# Patient Record
Sex: MALE | Race: OTHER | HISPANIC OR LATINO | URBAN - METROPOLITAN AREA
[De-identification: names, ages, dates, MRNs, and addresses within clinical notes are randomized per-mention and may not be internally consistent; named-entity substitution may affect disease eponyms.]

---

## 2023-05-07 ENCOUNTER — EMERGENCY (EMERGENCY)
Facility: HOSPITAL | Age: 73
LOS: 1 days | Discharge: HOME | End: 2023-05-07
Attending: EMERGENCY MEDICINE | Admitting: EMERGENCY MEDICINE
Payer: MEDICARE

## 2023-05-07 VITALS
SYSTOLIC BLOOD PRESSURE: 121 MMHG | OXYGEN SATURATION: 97 % | RESPIRATION RATE: 18 BRPM | HEART RATE: 77 BPM | DIASTOLIC BLOOD PRESSURE: 76 MMHG | TEMPERATURE: 98 F

## 2023-05-07 VITALS
HEIGHT: 66 IN | HEART RATE: 98 BPM | SYSTOLIC BLOOD PRESSURE: 109 MMHG | TEMPERATURE: 97 F | RESPIRATION RATE: 18 BRPM | OXYGEN SATURATION: 97 % | DIASTOLIC BLOOD PRESSURE: 66 MMHG | WEIGHT: 149.91 LBS

## 2023-05-07 LAB
ANION GAP SERPL CALC-SCNC: 6 MMOL/L — SIGNIFICANT CHANGE UP (ref 5–17)
BUN SERPL-MCNC: 13 MG/DL — SIGNIFICANT CHANGE UP (ref 7–23)
CALCIUM SERPL-MCNC: 9.3 MG/DL — SIGNIFICANT CHANGE UP (ref 8.4–10.5)
CHLORIDE SERPL-SCNC: 100 MMOL/L — SIGNIFICANT CHANGE UP (ref 96–108)
CO2 SERPL-SCNC: 31 MMOL/L — SIGNIFICANT CHANGE UP (ref 22–31)
CREAT SERPL-MCNC: 0.78 MG/DL — SIGNIFICANT CHANGE UP (ref 0.5–1.3)
EGFR: 94 ML/MIN/1.73M2 — SIGNIFICANT CHANGE UP
GLUCOSE SERPL-MCNC: 86 MG/DL — SIGNIFICANT CHANGE UP (ref 70–99)
HCT VFR BLD CALC: 40.8 % — SIGNIFICANT CHANGE UP (ref 39–50)
HGB BLD-MCNC: 13.8 G/DL — SIGNIFICANT CHANGE UP (ref 13–17)
MCHC RBC-ENTMCNC: 32.5 PG — SIGNIFICANT CHANGE UP (ref 27–34)
MCHC RBC-ENTMCNC: 33.8 GM/DL — SIGNIFICANT CHANGE UP (ref 32–36)
MCV RBC AUTO: 96.2 FL — SIGNIFICANT CHANGE UP (ref 80–100)
NRBC # BLD: 0 /100 WBCS — SIGNIFICANT CHANGE UP (ref 0–0)
PLATELET # BLD AUTO: 197 K/UL — SIGNIFICANT CHANGE UP (ref 150–400)
POTASSIUM SERPL-MCNC: 4.7 MMOL/L — SIGNIFICANT CHANGE UP (ref 3.5–5.3)
POTASSIUM SERPL-SCNC: 4.7 MMOL/L — SIGNIFICANT CHANGE UP (ref 3.5–5.3)
RBC # BLD: 4.24 M/UL — SIGNIFICANT CHANGE UP (ref 4.2–5.8)
RBC # FLD: 12.6 % — SIGNIFICANT CHANGE UP (ref 10.3–14.5)
SODIUM SERPL-SCNC: 137 MMOL/L — SIGNIFICANT CHANGE UP (ref 135–145)
WBC # BLD: 4.49 K/UL — SIGNIFICANT CHANGE UP (ref 3.8–10.5)
WBC # FLD AUTO: 4.49 K/UL — SIGNIFICANT CHANGE UP (ref 3.8–10.5)

## 2023-05-07 PROCEDURE — 85027 COMPLETE CBC AUTOMATED: CPT

## 2023-05-07 PROCEDURE — 99283 EMERGENCY DEPT VISIT LOW MDM: CPT

## 2023-05-07 PROCEDURE — 36415 COLL VENOUS BLD VENIPUNCTURE: CPT

## 2023-05-07 PROCEDURE — 99284 EMERGENCY DEPT VISIT MOD MDM: CPT

## 2023-05-07 PROCEDURE — 80048 BASIC METABOLIC PNL TOTAL CA: CPT

## 2023-05-07 RX ORDER — TRAMADOL HYDROCHLORIDE 50 MG/1
1 TABLET ORAL
Qty: 12 | Refills: 0
Start: 2023-05-07

## 2023-05-07 NOTE — ED PROVIDER NOTE - PHYSICAL EXAMINATION
CONSTITUTIONAL: Awake, alert and in no apparent distress.  HEENT: Head is atraumatic. Eyes clear bilaterally, normal EOMI. Airway patent. no facial droop, no subj sensory deficit to either side  CARDIAC: Normal rate, regular rhythm.  Heart sounds S1, S2.   RESPIRATORY: Breath sounds clear and equal bilaterally. no tachypnea, respiratory distress.   GASTROINTESTINAL: Abdomen soft, non-tender, no guarding, distension.  MUSCULOSKELETAL: Spine appears normal, no midline spinal tenderness, range of motion is not limited, no muscle or joint tenderness. no bony tenderness.   NEUROLOGICAL: Alert, no focal deficits, no motor or sensory deficits.  SKIN: Skin normal color for race, warm, dry and intact. No evidence of rash.  PSYCHIATRIC: Normal mood and affect. no apparent risk to self or others.

## 2023-05-07 NOTE — ED ADULT NURSE NOTE - CAS EDN DISCHARGE INTERVENTIONS
n/a Peng Advancement Flap Text: The defect edges were debeveled with a #15 scalpel blade.  Given the location of the defect, shape of the defect and the proximity to free margins a Peng advancement flap was deemed most appropriate.  Using a sterile surgical marker, an appropriate advancement flap was drawn incorporating the defect and placing the expected incisions within the relaxed skin tension lines where possible. The area thus outlined was incised deep to adipose tissue with a #15 scalpel blade.  The skin margins were undermined to an appropriate distance in all directions utilizing iris scissors.

## 2023-05-07 NOTE — ED ADULT NURSE NOTE - CHIEF COMPLAINT QUOTE
Pt to ED c/o worsening facial pain "in waves" over last week. Pt has hx of trigeminal neuralgia. Per pt's daughter "He has been acting more slowly over the last 3 months". Denies dizziness, slurred speech, headache. Ambulates with a steady gait.

## 2023-05-07 NOTE — ED PROVIDER NOTE - OBJECTIVE STATEMENT
73-year-old history of trigeminal neuralgia, hypertension presenting with left facial pain worsening over the last 4 years.  Patient here with daughter, patient states that the left facial pain has inhibited his ability to eat and sleep properly.  Daughter states that as a result has lost a lot of weight greater than 20 pounds because of symptoms.  Currently on gabapentin 800 mg 3 times a day and carbamazepine 200 mg twice a day.  This is in addition to Tylenol and ibuprofen.  Patient denies any facial droop sensory deficits.  Pain fever chills neck pain headache trauma.  No other complaints.  Denies any shortness of breath chest pain leg swelling.  Was diagnosed approximately 4 years ago in [place].

## 2023-05-07 NOTE — ED PROVIDER NOTE - CLINICAL SUMMARY MEDICAL DECISION MAKING FREE TEXT BOX
Ddx: worsening pain from trigmenial neuralgia, no other focal neuro deficits, fever  Will discuss w neurology, reassess.  Discussed w Dr. Jorge, will increase carbamazepine 200mg to 400mg BID and will fu in neuro clinic.

## 2023-05-07 NOTE — ED ADULT NURSE NOTE - OBJECTIVE STATEMENT
Patient presents to the ED complaining of L sided facial pain for months. Patient reports history of trigeminal neuralgia. As per daughter, patient takes medication but it is not helping.

## 2023-05-07 NOTE — ED PROVIDER NOTE - PATIENT PORTAL LINK FT
You can access the FollowMyHealth Patient Portal offered by [facility] by registering at the following website: [email or web address]. By joining [facility]’s FollowMyHealth portal, you will also be able to view your health information using other applications (apps) compatible with our system.

## 2023-05-09 DIAGNOSIS — R51.9 HEADACHE, UNSPECIFIED: ICD-10-CM

## 2023-05-09 DIAGNOSIS — G50.0 TRIGEMINAL NEURALGIA: ICD-10-CM

## 2023-05-09 DIAGNOSIS — I10 ESSENTIAL (PRIMARY) HYPERTENSION: ICD-10-CM

## 2023-05-09 PROBLEM — Z00.00 ENCOUNTER FOR PREVENTIVE HEALTH EXAMINATION: Status: ACTIVE | Noted: 2023-05-09

## 2023-05-10 ENCOUNTER — APPOINTMENT (OUTPATIENT)
Age: 73
End: 2023-05-10
Payer: SELF-PAY

## 2023-05-10 VITALS
BODY MASS INDEX: 22.58 KG/M2 | TEMPERATURE: 98.1 F | SYSTOLIC BLOOD PRESSURE: 109 MMHG | WEIGHT: 149 LBS | HEART RATE: 101 BPM | OXYGEN SATURATION: 95 % | HEIGHT: 68 IN | DIASTOLIC BLOOD PRESSURE: 67 MMHG

## 2023-05-10 PROCEDURE — 99204 OFFICE O/P NEW MOD 45 MIN: CPT

## 2023-05-10 RX ORDER — KRILL/OM-3/DHA/EPA/PHOSPHO/AST 1000-230MG
81 CAPSULE ORAL DAILY
Refills: 0 | Status: ACTIVE | COMMUNITY

## 2023-05-10 NOTE — DATA REVIEWED
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

## 2023-05-10 NOTE — PHYSICAL EXAM
[FreeTextEntry1] : General: this is a pleasant patient in no acute distress\par \par HEENT conjunctiva are normal, no tenderness in head\par \par CV: normal pulses, regular rate and rhythm, no peripheral edema noted\par \par Lungs: breathing is non-labored\par \par abd: soft and non-distended\par \par MSK:\par SLR: \par AP:\par range of motion:\par tinnels: \par spurling:\par Occipital nerve tenderness:\par \par Mental status:\par Alert and oriented to person, place and time, normal speech and comprehension\par \par Cranial Nerves:\par extra-occular movements in tact without nystagmus, normal saccades and smooth pursuit, Face symmetric and facial strength symmetric, facial sensation symmetric, \par \par Motor: normal bulk and tone throughout. no abnormal movements.  Full 5/5 strength uppers and lower extremities proximally and distally\par \par Sensory: in tact and symmetric to vibration, light tough, temperature except stocking and glove decreased temp\par \par Cerebellar: normal finger-nose-finger bilaterally\par \par Reflexes: 2+ in the upper and lower extremities and symmetric.  toes are bilaterally downgoing.\par \par Gait: stable, able to tip toe heel and tandem\par \par Stances:\par Romberg: normal\par

## 2023-05-10 NOTE — HISTORY OF PRESENT ILLNESS
[FreeTextEntry1] : BRENT DIXON is a 73 year who presents with trigeminal neuralgia\par \par 3 years of problems\par \par pain in left side, feels under tongue, bottom and top jaw, stops him from eating. has lost 30lbs due to this. \par \par tried gabapentin 800mg TID, CBZ 400mg BID\par \par tramadol 50mg mildly beneficial\par \par had some face injection that did nothing. never tried OXC.  \par \par [facility] saying that he had 16/30 on an MMSE. he does have more issues recently with increased med doses. tired all the time. sleeps a lot.\par \par he has several other medical comorbidities and recently was hospitalized for abdominal bleeding, also has right heart problems\par \par

## 2023-05-11 LAB — CARBAMAZEPINE SERPL-MCNC: 11.7 UG/ML

## 2023-05-19 ENCOUNTER — APPOINTMENT (OUTPATIENT)
Dept: NEUROSURGERY | Facility: CLINIC | Age: 73
End: 2023-05-19
Payer: SELF-PAY

## 2023-05-19 ENCOUNTER — NON-APPOINTMENT (OUTPATIENT)
Age: 73
End: 2023-05-19

## 2023-05-19 PROCEDURE — 99205 OFFICE O/P NEW HI 60 MIN: CPT

## 2023-05-19 NOTE — PHYSICAL EXAM
[General Appearance - Alert] : alert [General Appearance - In No Acute Distress] : in no acute distress [Oriented To Time, Place, And Person] : oriented to person, place, and time [Person] : oriented to person [Place] : oriented to place [Time] : oriented to time [Motor Tone] : muscle tone was normal in all four extremities [Motor Strength] : muscle strength was normal in all four extremities [Abnormal Walk] : normal gait [Balance] : balance was intact [Hearing Threshold Finger Rub Not Beadle] : hearing was normal [] : no respiratory distress

## 2023-05-21 NOTE — REASON FOR VISIT
[New Patient Visit] : a new patient visit [FreeTextEntry1] : trigeminal neuralgia surgical discussion

## 2023-05-21 NOTE — HISTORY OF PRESENT ILLNESS
[FreeTextEntry1] : BRENT DIXON is a 73 year left handed male with pmhx HTN, COPD, lung nodule, aortic aneurysm, constipation who presents with trigeminal neuralgia referred by Dr. Wiley (neurologist) for surgical consultation. \par Patient has been having left facial pain x 3 years. \par \par Started under tongue, bottom and top jaw, and progressed to the left side of face. Stops him from eating. Has lost 30lbs due to this. Has tried gabapentin 800mg TID, carbamazepine 400mg BID, tramadol 50mg with minimal improvement. Had some face injection that did nothing.\par \par [facility] saying that he had 16/30 on an MMSE. he does have more issues recently with increased med doses. tired all the time. sleeps a lot.\par \par Patient presents today for surgical intervention of trigeminal neuralgia.

## 2023-05-21 NOTE — ASSESSMENT
[FreeTextEntry1] : MRI head w/wo from 1/6/23 reviewed by Dr. 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 Recommend not proceeding with surgical intervention given his medical co-morbidies and age.\par \par Gamma Knife Radiosurgery is reasonable and patient wishes to proceed. Risks, alternatives and benefits to Gamma Knife Radiosurgery discussed. Risks include but are not limited to cerebral edema, radionecrosis, seizures, continued pain. Patient understands and wishes to proceed.\par \par Explained procedure to patient in detail including headframe placement,  new MRI the day of procedure, radiation treatment, and post-procedure care. \par Radiation treatment will take place at [address] [place] [address]. Directions and contact information provided to patient.\par Education packet regarding Gamma Knife Radiosurgery provided.\par Multiple questions answered to patient's satisfaction.\par \par Patient and patient's family verbalize agreement and understanding with plan of care.\par \par I, Dr. Staton, personally performed the evaluation and management (E/M) services for this established patient who presents today with (a) new problem(s)/exacerbation of (an) existing condition(s).  That E/M includes conducting the examination, assessing all new/exacerbated conditions, and establishing a new plan of care.  Today, my ACP, Yvette Lopes, was here to observe my evaluation and management services for this new problem/exacerbated condition to be followed going forward.\par

## 2023-05-21 NOTE — REVIEW OF SYSTEMS
[Numbness] : numbness [Tingling] : tingling [Abnormal Sensation] : an abnormal sensation [Facial Weakness] : no facial weakness [Seizures] : no convulsions [Dizziness] : no dizziness [Fainting] : no fainting [Lightheadedness] : no lightheadedness [Cluster Headache] : no cluster headache

## 2023-05-22 ENCOUNTER — OUTPATIENT (OUTPATIENT)
Dept: OUTPATIENT SERVICES | Facility: HOSPITAL | Age: 73
LOS: 1 days | Discharge: HOME | End: 2023-05-22
Payer: COMMERCIAL

## 2023-05-22 RX ORDER — GABAPENTIN 800 MG/1
800 TABLET, COATED ORAL 3 TIMES DAILY
Qty: 90 | Refills: 0 | Status: ACTIVE | COMMUNITY
Start: 2023-05-22 | End: 1900-01-01

## 2023-05-24 ENCOUNTER — NON-APPOINTMENT (OUTPATIENT)
Age: 73
End: 2023-05-24

## 2023-05-30 ENCOUNTER — APPOINTMENT (OUTPATIENT)
Dept: RADIATION ONCOLOGY | Facility: CLINIC | Age: 73
End: 2023-05-30
Payer: SELF-PAY

## 2023-05-30 PROCEDURE — 77263 THER RADIOLOGY TX PLNG CPLX: CPT

## 2023-05-30 PROCEDURE — 99204 OFFICE O/P NEW MOD 45 MIN: CPT | Mod: 95

## 2023-05-30 RX ORDER — BACLOFEN 10 MG/1
10 TABLET ORAL 3 TIMES DAILY
Qty: 90 | Refills: 0 | Status: ACTIVE | COMMUNITY
Start: 2023-05-30 | End: 1900-01-01

## 2023-05-30 NOTE — REASON FOR VISIT
[Consideration of Therapy for Benign Disease] : consideration of therapy for benign disease [Other: ___] : [unfilled] [Spouse] : spouse [[employer] ] : provided by [employer]   [Interpreters_IDNumber] : [id number] [TWNoteComboBox1] : Lithuanian

## 2023-05-30 NOTE — REVIEW OF SYSTEMS
[Recent Change In Weight] : ~T recent weight change [Palpitations] : palpitations [Constipation] : constipation [Joint Pain] : joint pain [Anxiety] : anxiety [Negative] : Allergic/Immunologic [FreeTextEntry4] : left facial pain

## 2023-05-30 NOTE — VITALS
[Maximal Pain Intensity: 10/10] : 10/10 [Least Pain Intensity: 10/10] : 10/10 [Pain Description/Quality: ___] : Pain description/quality: [unfilled] [Pain Duration: ___] : Pain duration: [unfilled] [Pain Location: ___] : Pain Location: [unfilled] [NSAID/Non-Opioid] : NSAID/Non-Opioid [80: Normal activity with effort; some signs or symptoms of disease.] : 80: Normal activity with effort; some signs or symptoms of disease.  [ECOG Performance Status: 0 - Fully active, able to carry on all pre-disease performance without restriction] : Performance Status: 0 - Fully active, able to carry on all pre-disease performance without restriction

## 2023-05-30 NOTE — HISTORY OF PRESENT ILLNESS
[Home] : at home, [unfilled] , at the time of the visit. [Medical Office: ([facility])___] : at the medical office located in  [Spouse] : spouse [Verbal consent obtained from patient] : the patient, [unfilled] [FreeTextEntry1] : \par BRENT DIXON is a 73 year old M symptomatic trigeminal neuralgia here today to discuss gamma knife radiosurgery.\par \par He reports left facial pain x 3 years. He states it started under the tongue, bottom and top jaw then progressing to the left side of his face. This stops him from eating which has resulted in a 20-30 lb weight loss. Currently on gabapentin, CBZ. He has tried Tramadol 50mg with mild relief. Reports facial injections with no relief. He was referred to neurology who recommended surgical intervention.\par Consulted with Dr. Staton 5/2023\par \par Today, he continues with intense pain to the left side of tongue to left side of face.  Able to eat but smaller amounts of regular foods.

## 2023-06-05 ENCOUNTER — APPOINTMENT (OUTPATIENT)
Dept: INTERNAL MEDICINE | Facility: CLINIC | Age: 73
End: 2023-06-05
Payer: COMMERCIAL

## 2023-06-05 ENCOUNTER — OUTPATIENT (OUTPATIENT)
Dept: OUTPATIENT SERVICES | Facility: HOSPITAL | Age: 73
LOS: 1 days | End: 2023-06-05

## 2023-06-05 VITALS
OXYGEN SATURATION: 94 % | WEIGHT: 150 LBS | TEMPERATURE: 97.2 F | BODY MASS INDEX: 22.73 KG/M2 | SYSTOLIC BLOOD PRESSURE: 112 MMHG | RESPIRATION RATE: 16 BRPM | DIASTOLIC BLOOD PRESSURE: 64 MMHG | HEART RATE: 88 BPM | HEIGHT: 68 IN

## 2023-06-05 DIAGNOSIS — J31.0 CHRONIC RHINITIS: ICD-10-CM

## 2023-06-05 DIAGNOSIS — E55.9 VITAMIN D DEFICIENCY, UNSPECIFIED: ICD-10-CM

## 2023-06-05 DIAGNOSIS — Z01.818 ENCOUNTER FOR OTHER PREPROCEDURAL EXAMINATION: ICD-10-CM

## 2023-06-05 DIAGNOSIS — E78.5 HYPERLIPIDEMIA, UNSPECIFIED: ICD-10-CM

## 2023-06-05 DIAGNOSIS — I10 ESSENTIAL (PRIMARY) HYPERTENSION: ICD-10-CM

## 2023-06-05 DIAGNOSIS — F17.210 NICOTINE DEPENDENCE, CIGARETTES, UNCOMPLICATED: ICD-10-CM

## 2023-06-05 DIAGNOSIS — J32.9 CHRONIC RHINITIS: ICD-10-CM

## 2023-06-05 LAB
CHOLEST SERPL-MCNC: 225 MG/DL
HDLC SERPL-MCNC: 65 MG/DL
LDLC SERPL CALC-MCNC: 128 MG/DL
NONHDLC SERPL-MCNC: 160 MG/DL
TRIGL SERPL-MCNC: 163 MG/DL

## 2023-06-05 PROCEDURE — ZZZZZ: CPT | Mod: GC

## 2023-06-05 RX ORDER — CARBAMAZEPINE 200 MG/1
200 CAPSULE, EXTENDED RELEASE ORAL
Refills: 0 | Status: DISCONTINUED | COMMUNITY
End: 2023-06-05

## 2023-06-05 RX ORDER — AMLODIPINE BESYLATE 10 MG/1
10 TABLET ORAL DAILY
Refills: 0 | Status: DISCONTINUED | COMMUNITY
End: 2023-06-05

## 2023-06-05 RX ORDER — LISINOPRIL 30 MG/1
30 TABLET ORAL DAILY
Qty: 30 | Refills: 0 | Status: ACTIVE | COMMUNITY
Start: 2023-06-05 | End: 1900-01-01

## 2023-06-05 RX ORDER — LISINOPRIL 30 MG/1
30 TABLET ORAL DAILY
Refills: 0 | Status: DISCONTINUED | COMMUNITY
End: 2023-06-05

## 2023-06-05 RX ORDER — AMLODIPINE BESYLATE 10 MG/1
10 TABLET ORAL
Qty: 30 | Refills: 0 | Status: ACTIVE | COMMUNITY
Start: 2023-06-05 | End: 1900-01-01

## 2023-06-05 RX ORDER — GABAPENTIN 800 MG/1
800 TABLET, COATED ORAL
Refills: 0 | Status: DISCONTINUED | COMMUNITY
End: 2023-06-05

## 2023-06-05 RX ORDER — CARBAMAZEPINE 200 MG/1
200 CAPSULE, EXTENDED RELEASE ORAL
Qty: 60 | Refills: 0 | Status: ACTIVE | COMMUNITY
Start: 2023-06-05 | End: 1900-01-01

## 2023-06-06 LAB
ALBUMIN SERPL ELPH-MCNC: 4.5 G/DL
ALP BLD-CCNC: 87 U/L
ALT SERPL-CCNC: 5 U/L
ANION GAP SERPL CALC-SCNC: 12 MMOL/L
APTT BLD: 31.3 SEC
AST SERPL-CCNC: 13 U/L
BILIRUB SERPL-MCNC: 0.2 MG/DL
BUN SERPL-MCNC: 9 MG/DL
CALCIUM SERPL-MCNC: 9.8 MG/DL
CHLORIDE SERPL-SCNC: 98 MMOL/L
CO2 SERPL-SCNC: 28 MMOL/L
CREAT SERPL-MCNC: 0.7 MG/DL
EGFR: 97 ML/MIN/1.73M2
GLUCOSE SERPL-MCNC: 109 MG/DL
INR PPP: 1.03 RATIO
MAGNESIUM SERPL-MCNC: 2.2 MG/DL
PHOSPHATE SERPL-MCNC: 3.4 MG/DL
POTASSIUM SERPL-SCNC: 4 MMOL/L
PROT SERPL-MCNC: 7.1 G/DL
PT BLD: 12 SEC
SODIUM SERPL-SCNC: 138 MMOL/L

## 2023-06-08 ENCOUNTER — OUTPATIENT (OUTPATIENT)
Dept: OUTPATIENT SERVICES | Facility: HOSPITAL | Age: 73
LOS: 1 days | End: 2023-06-08
Payer: COMMERCIAL

## 2023-06-08 ENCOUNTER — APPOINTMENT (OUTPATIENT)
Dept: MRI IMAGING | Facility: IMAGING CENTER | Age: 73
End: 2023-06-08

## 2023-06-08 ENCOUNTER — APPOINTMENT (OUTPATIENT)
Dept: NEUROSURGERY | Facility: AMBULATORY SURGERY CENTER | Age: 73
End: 2023-06-08
Payer: SELF-PAY

## 2023-06-08 DIAGNOSIS — G50.0 TRIGEMINAL NEURALGIA: ICD-10-CM

## 2023-06-08 PROCEDURE — 77300 RADIATION THERAPY DOSE PLAN: CPT | Mod: 26

## 2023-06-08 PROCEDURE — A9585: CPT

## 2023-06-08 PROCEDURE — 61798 SRS CRANIAL LESION COMPLEX: CPT

## 2023-06-08 PROCEDURE — 77295 3-D RADIOTHERAPY PLAN: CPT | Mod: 26

## 2023-06-08 PROCEDURE — 61800 APPLY SRS HEADFRAME ADD-ON: CPT

## 2023-06-08 PROCEDURE — 76498 UNLISTED MR PROCEDURE: CPT

## 2023-06-08 PROCEDURE — 77432 STEREOTACTIC RADIATION TRMT: CPT

## 2023-06-08 RX ORDER — MULTIVIT-MIN/FOLIC/VIT K/LYCOP 400-300MCG
50 MCG TABLET ORAL
Qty: 30 | Refills: 0 | Status: DISCONTINUED | COMMUNITY
Start: 2023-06-05 | End: 2023-06-08

## 2023-06-08 RX ORDER — GABAPENTIN 800 MG/1
800 TABLET, COATED ORAL 3 TIMES DAILY
Qty: 90 | Refills: 0 | Status: DISCONTINUED | COMMUNITY
Start: 2023-05-30 | End: 2023-06-08

## 2023-06-08 RX ORDER — LIDOCAINE HYDROCHLORIDE 20 MG/ML
2 INJECTION, SOLUTION INFILTRATION; PERINEURAL
Qty: 0 | Refills: 0 | Status: COMPLETED | OUTPATIENT
Start: 2023-06-08

## 2023-06-08 RX ORDER — TRAMADOL HYDROCHLORIDE 50 MG/1
50 TABLET, COATED ORAL TWICE DAILY
Refills: 0 | Status: DISCONTINUED | COMMUNITY
End: 2023-06-08

## 2023-06-08 RX ORDER — SODIUM CHLORIDE 9 G/ML
0.9 INJECTION, SOLUTION INTRAVENOUS
Qty: 0 | Refills: 0 | Status: COMPLETED | OUTPATIENT
Start: 2023-06-08

## 2023-06-08 RX ORDER — ACETAMINOPHEN 10 MG/ML
1000 INJECTION INTRAVENOUS
Qty: 0 | Refills: 0 | Status: COMPLETED | OUTPATIENT
Start: 2023-06-08

## 2023-06-08 RX ORDER — FLUTICASONE PROPIONATE 50 UG/1
50 SPRAY, METERED NASAL
Qty: 1 | Refills: 3 | Status: DISCONTINUED | COMMUNITY
Start: 2023-06-05 | End: 2023-06-08

## 2023-06-08 RX ORDER — BACLOFEN 10 MG/1
10 TABLET ORAL
Refills: 0 | Status: DISCONTINUED | COMMUNITY
End: 2023-06-08

## 2023-06-08 RX ORDER — PHENYLEPHRINE HCL 10 MG
7 TABLET ORAL DAILY
Qty: 30 | Refills: 0 | Status: DISCONTINUED | COMMUNITY
Start: 2023-06-05 | End: 2023-06-08

## 2023-06-08 RX ORDER — ATORVASTATIN CALCIUM 10 MG/1
10 TABLET, FILM COATED ORAL
Qty: 30 | Refills: 0 | Status: DISCONTINUED | COMMUNITY
Start: 2023-06-05 | End: 2023-06-08

## 2023-06-08 NOTE — PLAN
[FreeTextEntry1] : 73 year left handed male w HTN, HLD, COPD, LLL nodule, rhinosinusitis, aortic aneurysm, aortic mural thrombus (prescribed eliquis 2.5 BID, but never used), vitamin D deficiency, constipation, L facial pain 2/2 trigeminal neuralgia x 3 years presenting for preoperative evaluation for gamma knife radiosurgery. \par \par #Pre-Op\par RCRI score 1 for procedural risk. \par NSQIP with MACE < 1%\par No additional cardiac testing required prior to procedure.\par Patient is currently medically optimized for proposed gamma knife radiosurgery.\par \par Enrolled in [facility] for medication refills.\par \par Alejandra De La Fuente, PGY1\par [employer]\par \par Discussed w Dr. Strange\par \par RTC in 3 months for CPE.\par \par

## 2023-06-08 NOTE — RESULTS/DATA
[] : results reviewed [de-identified] : RBBB and LA enlargment. Can consider TTE if TTE results cannot be obtained from prior records.

## 2023-06-08 NOTE — PHYSICAL EXAM
[No Edema] : there was no peripheral edema [No Extremity Clubbing/Cyanosis] : no extremity clubbing/cyanosis [Normal] : affect was normal and insight and judgment were intact [de-identified] : CN II-XII intact. normal gait exam. No unsteadiness noted.

## 2023-06-08 NOTE — INTERPRETER SERVICES
[Patient Declined  Services] : - None: Patient declined  services [Interpreters_Relationshiptopatient] : Wife [TWNoteComboBox1] : Swedish

## 2023-06-08 NOTE — HISTORY OF PRESENT ILLNESS
[COPD] : COPD [Smoker] : smoker [Good (7-10 METs)] : Good (7-10 METs) [Anti-Platelet Agents: _____] : Anti-Platelet Agents: [unfilled] [NSAIDs: _____] : NSAIDs: [unfilled] [Spouse] : spouse [(Patient denies any chest pain, claudication, dyspnea on exertion, orthopnea, palpitations or syncope)] : Patient denies any chest pain, claudication, dyspnea on exertion, orthopnea, palpitations or syncope [Aortic Stenosis] : no aortic stenosis [Atrial Fibrillation] : no atrial fibrillation [Coronary Artery Disease] : no coronary artery disease [Recent Myocardial Infarction] : no recent myocardial infarction [Implantable Device/Pacemaker] : no implantable device/pacemaker [Asthma] : no asthma [Sleep Apnea] : no sleep apnea [Family Member] : no family member with adverse anesthesia reaction/sudden death [Self] : no previous adverse anesthesia reaction [Chronic Anticoagulation] : no chronic anticoagulation [Chronic Kidney Disease] : no chronic kidney disease [Diabetes] : no diabetes [FreeTextEntry1] : Gamma Knife Radiosurgery [FreeTextEntry2] : 06/08/2023 [FreeTextEntry4] : 73 year left handed male w HTN, HLD, COPD, LLL nodule, rhinosinusitis, aortic aneurysm, aortic mural thrombus (prescribed eliquis 2.5 BID, but never used), vitamin D deficiency, constipation, L facial pain 2/2 trigeminal neuralgia x 3 years presenting for preoperative evaluation for gamma knife radiosurgery. \par \par #Trigeminal Neuralgia:\par Reports left facial pain x 3 years. Initially started under tongue, bottom and top jaw, and progressed to the left side of face. Stops him from eating. Has lost 30lbs due to this. No dizziness or lightheadedness. No syncope. No trouble with swallowing. Has tried gabapentin 800mg TID, carbamazepine 200mg BID, tramadol 50mg with minimal improvement. Had some face injection that did nothing. Evaluated by neurology and neurosurgery and plan is to perform gamma knife radiosurgery on 6/8/2023. \par \par #Aortic Mural Trombus\par Reports diagnosis of aortic mural thrombus for which he was prescribed eliquis 2.5 BID however, patient never received prescription for it and has not taken this medication. Reports only taking ASA 81 daily. Unable to give additional collateral. Denies any history of DVT, PE or PAD. Not having any chest pain or shortness of breath. [FreeTextEntry7] : EKG 6/5/23 with evidence of RBBB and L atrial enlargement

## 2023-06-08 NOTE — REVIEW OF SYSTEMS
[Negative] : Musculoskeletal [Headache] : no headache [Dizziness] : no dizziness [Unsteady Walk] : no ataxia [FreeTextEntry4] : L sided facial pain

## 2023-06-08 NOTE — CURRENT MEDS
[Cost] : cost [Yes] : Reviewed medication list for presence of high-risk medications. [Takes medication as prescribed] : does not take

## 2023-06-09 DIAGNOSIS — Z01.818 ENCOUNTER FOR OTHER PREPROCEDURAL EXAMINATION: ICD-10-CM

## 2023-06-09 DIAGNOSIS — I10 ESSENTIAL (PRIMARY) HYPERTENSION: ICD-10-CM

## 2023-06-09 DIAGNOSIS — E55.9 VITAMIN D DEFICIENCY, UNSPECIFIED: ICD-10-CM

## 2023-06-09 DIAGNOSIS — G50.0 TRIGEMINAL NEURALGIA: ICD-10-CM

## 2023-06-09 DIAGNOSIS — F17.210 NICOTINE DEPENDENCE, CIGARETTES, UNCOMPLICATED: ICD-10-CM

## 2023-06-09 DIAGNOSIS — E78.5 HYPERLIPIDEMIA, UNSPECIFIED: ICD-10-CM

## 2023-06-09 DIAGNOSIS — J31.0 CHRONIC RHINITIS: ICD-10-CM

## 2023-06-12 ENCOUNTER — NON-APPOINTMENT (OUTPATIENT)
Age: 73
End: 2023-06-12

## 2023-06-20 RX ORDER — CARBAMAZEPINE 200 MG/1
200 CAPSULE, EXTENDED RELEASE ORAL
Qty: 60 | Refills: 0 | Status: ACTIVE | COMMUNITY
Start: 2023-06-20 | End: 1900-01-01

## 2023-06-20 RX ORDER — GABAPENTIN 800 MG/1
800 TABLET, COATED ORAL 3 TIMES DAILY
Qty: 90 | Refills: 0 | Status: ACTIVE | COMMUNITY
Start: 2023-06-20 | End: 1900-01-01

## 2023-06-26 RX ORDER — GABAPENTIN 800 MG/1
800 TABLET, COATED ORAL 3 TIMES DAILY
Qty: 90 | Refills: 0 | Status: ACTIVE | COMMUNITY
Start: 2023-06-26 | End: 1900-01-01

## 2023-07-08 PROBLEM — Z86.79 HISTORY OF HYPERTENSION: Status: RESOLVED | Noted: 2023-05-19 | Resolved: 2023-07-08

## 2023-07-08 PROBLEM — F17.200 CURRENT EVERY DAY SMOKER: Status: ACTIVE | Noted: 2023-05-19

## 2023-07-08 PROBLEM — Z86.79 HISTORY OF AORTIC ANEURYSM: Status: RESOLVED | Noted: 2023-05-19 | Resolved: 2023-07-08

## 2023-07-10 ENCOUNTER — APPOINTMENT (OUTPATIENT)
Dept: NEUROSURGERY | Facility: CLINIC | Age: 73
End: 2023-07-10
Payer: COMMERCIAL

## 2023-07-10 VITALS
DIASTOLIC BLOOD PRESSURE: 65 MMHG | SYSTOLIC BLOOD PRESSURE: 101 MMHG | OXYGEN SATURATION: 95 % | HEART RATE: 85 BPM | TEMPERATURE: 98.1 F

## 2023-07-10 DIAGNOSIS — Z86.79 PERSONAL HISTORY OF OTHER DISEASES OF THE CIRCULATORY SYSTEM: ICD-10-CM

## 2023-07-10 DIAGNOSIS — F17.200 NICOTINE DEPENDENCE, UNSPECIFIED, UNCOMPLICATED: ICD-10-CM

## 2023-07-10 PROCEDURE — 99024 POSTOP FOLLOW-UP VISIT: CPT

## 2023-07-10 RX ORDER — DEXAMETHASONE 1 MG/1
1 TABLET ORAL
Qty: 70 | Refills: 0 | Status: ACTIVE | COMMUNITY
Start: 2023-07-10 | End: 1900-01-01

## 2023-07-10 RX ORDER — DEXAMETHASONE 0.5 MG/.5MG
0.5 TABLET ORAL
Qty: 9 | Refills: 0 | Status: ACTIVE | COMMUNITY
Start: 2023-07-10 | End: 1900-01-01

## 2023-07-10 RX ORDER — GABAPENTIN 600 MG/1
600 TABLET, COATED ORAL 3 TIMES DAILY
Qty: 90 | Refills: 0 | Status: ACTIVE | COMMUNITY
Start: 2023-07-10 | End: 1900-01-01

## 2023-07-10 NOTE — PHYSICAL EXAM
[General Appearance - Alert] : alert [General Appearance - In No Acute Distress] : in no acute distress [General Appearance - Well Nourished] : well nourished [] : normal voice and communication [Oriented To Time, Place, And Person] : oriented to person, place, and time [Impaired Insight] : insight and judgment were intact [Affect] : the affect was normal [Memory Recent] : recent memory was not impaired

## 2023-07-11 RX ORDER — TRAMADOL HYDROCHLORIDE 50 MG/1
50 TABLET, COATED ORAL 3 TIMES DAILY
Qty: 30 | Refills: 0 | Status: ACTIVE | COMMUNITY
Start: 2023-07-11 | End: 1900-01-01

## 2023-07-11 NOTE — ASSESSMENT
[FreeTextEntry1] : PLAN\par - Decadron taper (4mg Q8H for 3 days, 2mg Q8H for 3 days, 1 mg Q8H for 3 days, 0.5 mg Q8H for 3 days)\par - tramadol PRN severe pain\par - gabapentin 600mg TID\par - f/u in September 2023 to reevaluate symptom (no images needed)

## 2023-07-11 NOTE — HISTORY OF PRESENT ILLNESS
[FreeTextEntry1] : BRENT DIXON is a 73 year left handed male with pmhx HTN, COPD, lung nodule, aortic aneurysm, constipation who presents with trigeminal neuralgia referred by Dr. Wiley (neurologist) for surgical consultation. \par Patient has been having left facial pain x 3 years. \par \par Started under tongue, bottom and top jaw, and progressed to the left side of face. Stops him from eating. Has lost 30lbs due to this. Has tried gabapentin 800mg TID, carbamazepine 400mg BID, tramadol 50mg with minimal improvement. Had some face injection that did nothing.\par \par [facility] from [facility] saying that he had 16/30 on an MMSE. he does have more issues recently with increased med doses. tired all the time. sleeps a lot.\par \par Today he returns for post GK follow up and c/o worsening L sided facial pain since the treatment and noticed R nasal congestion but denies any other new/worsening focal neuro deficits.\par

## 2023-07-11 NOTE — REASON FOR VISIT
[Family Member] : family member [Patient Declined  Services] : - None: Patient declined  services [de-identified] : gamma knife to left trigeminal nerve [de-identified] : 6/8/2023 [Interpreters_Relationshiptopatient] : daughter [TWNoteComboBox1] : Monegasque

## 2023-09-29 ENCOUNTER — APPOINTMENT (OUTPATIENT)
Dept: NEUROSURGERY | Facility: CLINIC | Age: 73
End: 2023-09-29
Payer: SELF-PAY

## 2023-09-29 ENCOUNTER — INPATIENT (INPATIENT)
Facility: HOSPITAL | Age: 73
LOS: 4 days | Discharge: HOME | DRG: 74 | End: 2023-10-04
Attending: NEUROLOGICAL SURGERY | Admitting: NEUROLOGICAL SURGERY
Payer: MEDICARE

## 2023-09-29 VITALS
SYSTOLIC BLOOD PRESSURE: 128 MMHG | OXYGEN SATURATION: 98 % | DIASTOLIC BLOOD PRESSURE: 79 MMHG | HEART RATE: 84 BPM | BODY MASS INDEX: 22.73 KG/M2 | RESPIRATION RATE: 18 BRPM | HEIGHT: 68 IN | TEMPERATURE: 97.6 F | WEIGHT: 150 LBS

## 2023-09-29 VITALS
DIASTOLIC BLOOD PRESSURE: 76 MMHG | WEIGHT: 149.91 LBS | RESPIRATION RATE: 16 BRPM | HEIGHT: 68 IN | TEMPERATURE: 97 F | SYSTOLIC BLOOD PRESSURE: 134 MMHG | HEART RATE: 84 BPM | OXYGEN SATURATION: 97 %

## 2023-09-29 DIAGNOSIS — Z98.890 OTHER SPECIFIED POSTPROCEDURAL STATES: Chronic | ICD-10-CM

## 2023-09-29 DIAGNOSIS — G50.0 TRIGEMINAL NEURALGIA: ICD-10-CM

## 2023-09-29 DIAGNOSIS — Z92.3 PERSONAL HISTORY OF IRRADIATION: ICD-10-CM

## 2023-09-29 LAB
ALBUMIN SERPL ELPH-MCNC: 4 G/DL — SIGNIFICANT CHANGE UP (ref 3.3–5)
ALP SERPL-CCNC: 61 U/L — SIGNIFICANT CHANGE UP (ref 40–120)
ALT FLD-CCNC: 10 U/L — SIGNIFICANT CHANGE UP (ref 10–45)
ANION GAP SERPL CALC-SCNC: 9 MMOL/L — SIGNIFICANT CHANGE UP (ref 5–17)
APTT BLD: 38.3 SEC — HIGH (ref 24.5–35.6)
AST SERPL-CCNC: 18 U/L — SIGNIFICANT CHANGE UP (ref 10–40)
BASOPHILS # BLD AUTO: 0.04 K/UL — SIGNIFICANT CHANGE UP (ref 0–0.2)
BASOPHILS NFR BLD AUTO: 0.8 % — SIGNIFICANT CHANGE UP (ref 0–2)
BILIRUB SERPL-MCNC: <0.2 MG/DL — SIGNIFICANT CHANGE UP (ref 0.2–1.2)
BUN SERPL-MCNC: 13 MG/DL — SIGNIFICANT CHANGE UP (ref 7–23)
CALCIUM SERPL-MCNC: 9.3 MG/DL — SIGNIFICANT CHANGE UP (ref 8.4–10.5)
CHLORIDE SERPL-SCNC: 103 MMOL/L — SIGNIFICANT CHANGE UP (ref 96–108)
CO2 SERPL-SCNC: 26 MMOL/L — SIGNIFICANT CHANGE UP (ref 22–31)
CREAT SERPL-MCNC: 0.79 MG/DL — SIGNIFICANT CHANGE UP (ref 0.5–1.3)
EGFR: 94 ML/MIN/1.73M2 — SIGNIFICANT CHANGE UP
EOSINOPHIL # BLD AUTO: 0.26 K/UL — SIGNIFICANT CHANGE UP (ref 0–0.5)
EOSINOPHIL NFR BLD AUTO: 5 % — SIGNIFICANT CHANGE UP (ref 0–6)
GLUCOSE SERPL-MCNC: 101 MG/DL — HIGH (ref 70–99)
HCT VFR BLD CALC: 39.9 % — SIGNIFICANT CHANGE UP (ref 39–50)
HGB BLD-MCNC: 13.8 G/DL — SIGNIFICANT CHANGE UP (ref 13–17)
IMM GRANULOCYTES NFR BLD AUTO: 0.2 % — SIGNIFICANT CHANGE UP (ref 0–0.9)
INR BLD: 0.91 — SIGNIFICANT CHANGE UP (ref 0.85–1.18)
LYMPHOCYTES # BLD AUTO: 1.96 K/UL — SIGNIFICANT CHANGE UP (ref 1–3.3)
LYMPHOCYTES # BLD AUTO: 37.4 % — SIGNIFICANT CHANGE UP (ref 13–44)
MCHC RBC-ENTMCNC: 32.5 PG — SIGNIFICANT CHANGE UP (ref 27–34)
MCHC RBC-ENTMCNC: 34.6 GM/DL — SIGNIFICANT CHANGE UP (ref 32–36)
MCV RBC AUTO: 93.9 FL — SIGNIFICANT CHANGE UP (ref 80–100)
MONOCYTES # BLD AUTO: 0.47 K/UL — SIGNIFICANT CHANGE UP (ref 0–0.9)
MONOCYTES NFR BLD AUTO: 9 % — SIGNIFICANT CHANGE UP (ref 2–14)
NEUTROPHILS # BLD AUTO: 2.5 K/UL — SIGNIFICANT CHANGE UP (ref 1.8–7.4)
NEUTROPHILS NFR BLD AUTO: 47.6 % — SIGNIFICANT CHANGE UP (ref 43–77)
NRBC # BLD: 0 /100 WBCS — SIGNIFICANT CHANGE UP (ref 0–0)
PLATELET # BLD AUTO: 158 K/UL — SIGNIFICANT CHANGE UP (ref 150–400)
POTASSIUM SERPL-MCNC: 4 MMOL/L — SIGNIFICANT CHANGE UP (ref 3.5–5.3)
POTASSIUM SERPL-SCNC: 4 MMOL/L — SIGNIFICANT CHANGE UP (ref 3.5–5.3)
PROT SERPL-MCNC: 6.6 G/DL — SIGNIFICANT CHANGE UP (ref 6–8.3)
PROTHROM AB SERPL-ACNC: 10.4 SEC — SIGNIFICANT CHANGE UP (ref 9.5–13)
RBC # BLD: 4.25 M/UL — SIGNIFICANT CHANGE UP (ref 4.2–5.8)
RBC # FLD: 12.6 % — SIGNIFICANT CHANGE UP (ref 10.3–14.5)
SODIUM SERPL-SCNC: 138 MMOL/L — SIGNIFICANT CHANGE UP (ref 135–145)
WBC # BLD: 5.24 K/UL — SIGNIFICANT CHANGE UP (ref 3.8–10.5)
WBC # FLD AUTO: 5.24 K/UL — SIGNIFICANT CHANGE UP (ref 3.8–10.5)

## 2023-09-29 PROCEDURE — 99285 EMERGENCY DEPT VISIT HI MDM: CPT

## 2023-09-29 PROCEDURE — 71045 X-RAY EXAM CHEST 1 VIEW: CPT | Mod: 26

## 2023-09-29 PROCEDURE — 99024 POSTOP FOLLOW-UP VISIT: CPT

## 2023-09-29 PROCEDURE — 93010 ELECTROCARDIOGRAM REPORT: CPT

## 2023-09-29 PROCEDURE — 70553 MRI BRAIN STEM W/O & W/DYE: CPT | Mod: 26

## 2023-09-29 RX ORDER — ENOXAPARIN SODIUM 100 MG/ML
40 INJECTION SUBCUTANEOUS
Refills: 0 | Status: DISCONTINUED | OUTPATIENT
Start: 2023-09-29 | End: 2023-10-03

## 2023-09-29 RX ORDER — GABAPENTIN 400 MG/1
600 CAPSULE ORAL EVERY 8 HOURS
Refills: 0 | Status: DISCONTINUED | OUTPATIENT
Start: 2023-09-29 | End: 2023-09-30

## 2023-09-29 RX ORDER — NICOTINE POLACRILEX 2 MG
4 GUM BUCCAL ONCE
Refills: 0 | Status: COMPLETED | OUTPATIENT
Start: 2023-09-29 | End: 2023-09-30

## 2023-09-29 RX ORDER — NICOTINE POLACRILEX 2 MG
1 GUM BUCCAL DAILY
Refills: 0 | Status: DISCONTINUED | OUTPATIENT
Start: 2023-09-30 | End: 2023-10-04

## 2023-09-29 RX ORDER — OXYCODONE HYDROCHLORIDE 5 MG/1
10 TABLET ORAL EVERY 6 HOURS
Refills: 0 | Status: DISCONTINUED | OUTPATIENT
Start: 2023-09-29 | End: 2023-10-02

## 2023-09-29 RX ORDER — NICOTINE POLACRILEX 2 MG
1 GUM BUCCAL DAILY
Refills: 0 | Status: DISCONTINUED | OUTPATIENT
Start: 2023-09-29 | End: 2023-09-29

## 2023-09-29 RX ORDER — ACETAMINOPHEN 500 MG
650 TABLET ORAL EVERY 6 HOURS
Refills: 0 | Status: DISCONTINUED | OUTPATIENT
Start: 2023-09-29 | End: 2023-09-30

## 2023-09-29 RX ORDER — SENNA PLUS 8.6 MG/1
2 TABLET ORAL AT BEDTIME
Refills: 0 | Status: DISCONTINUED | OUTPATIENT
Start: 2023-09-29 | End: 2023-10-04

## 2023-09-29 RX ORDER — OXYCODONE HYDROCHLORIDE 5 MG/1
5 TABLET ORAL EVERY 6 HOURS
Refills: 0 | Status: DISCONTINUED | OUTPATIENT
Start: 2023-09-29 | End: 2023-10-02

## 2023-09-29 RX ORDER — POLYETHYLENE GLYCOL 3350 17 G/17G
17 POWDER, FOR SOLUTION ORAL DAILY
Refills: 0 | Status: DISCONTINUED | OUTPATIENT
Start: 2023-09-29 | End: 2023-10-04

## 2023-09-29 RX ADMIN — GABAPENTIN 600 MILLIGRAM(S): 400 CAPSULE ORAL at 21:36

## 2023-09-29 RX ADMIN — SENNA PLUS 2 TABLET(S): 8.6 TABLET ORAL at 21:37

## 2023-09-29 RX ADMIN — ENOXAPARIN SODIUM 40 MILLIGRAM(S): 100 INJECTION SUBCUTANEOUS at 22:41

## 2023-09-29 RX ADMIN — OXYCODONE HYDROCHLORIDE 5 MILLIGRAM(S): 5 TABLET ORAL at 21:37

## 2023-09-29 RX ADMIN — OXYCODONE HYDROCHLORIDE 5 MILLIGRAM(S): 5 TABLET ORAL at 22:34

## 2023-09-29 RX ADMIN — OXYCODONE HYDROCHLORIDE 10 MILLIGRAM(S): 5 TABLET ORAL at 18:33

## 2023-09-29 NOTE — H&P ADULT - PROBLEM SELECTOR PLAN 1
-Admit to regional   -preop for OR 10/4 for trigeminal rhizotomy  -pending medical clearance  -pending cardiac clearance    D/w Dr. Staton

## 2023-09-29 NOTE — H&P ADULT - HISTORY OF PRESENT ILLNESS
74 y/o M w/ PMH of HTN and HLD (not on meds for HLD) presents to [facility] w/ L sided facial pain. It started about 1 year ago. Pain gets to 9/10 when severe. Presents as on and off pain during the day but mostly constant at night. Day episodes of pain last 45min-1hr. Pt says pain varies from sharp and dull. Pain is mostly felt from jawbone and below, but if it's very severe, radiates to entire L side of face. Doesn't radiate to any other places. Pt takes gabapentin 600mg tid. If pain is severe 4-5 times/day. Takes Tylenol sometimes, but neither helps to relieve pain.     Pt denies headaches, N/V, dizziness, fever, chills, weakness, numbness, chest pain.  72 y/o M w/ PMH of HTN, HLD (not on medication), COPD, lung nodule, aortic aneurysm, smoker (7-9 cigarettes per day x 40 years), trigeminal neuralgia s/p gamma knife to L trigeminal facial nerve w/o relief 6/2023 presents to [facility] from outpatient office for evaluation of severe L sided facial pain x1 year. Patient describes pain as located over the L cheek to jaw area, intermittently radiating to entire left face, occurs intermittent during the day but constant at night, varying from sharp and dull, rated 9/10 when severe, Episodes of pain typically last 45 minutes to 1 hour, has taken tylenol for pain without relief. Patient takes gabapentin 600mg tid at home, if pain is severe 4-5 times/day. Denies headaches, nausea, vomiting, dizziness, fever, chills, weakness, numbness, chest pain, vision changes, seizures, loss of consciousness, shortness of breath, palpitations, diarrhea, abdominal pain.

## 2023-09-29 NOTE — H&P ADULT - NSHPPHYSICALEXAM_GEN_ALL_CORE
General: Pt sitting comfortable in bed. No acute distress.  Head: Normocephalic. Atraumatic.   Skin: Warm, dry, intact. No evidence of rash.  Eyes: L pupil not perfectly circular. ANDI. EOMI.   Neurological: A&Ox3. CN II-XII grossly intact. Pain in L jaw to pressure. Slight R deviation of tongue? B/L UE and LE 5/5 strength. Sensation to light touch bilaterally. No pronator drift.   Cardiac: Regular rate and rhythm. Normal S1 and S2.   Respiratory: Nonlabored breathing. Symmetric chest rise bilaterally.  GI: Abdomen soft. Nontender, nondistended.   Extremities: Nonedematous. T(C): 36.3 (09-29-23 @ 15:39), Max: 36.3 (09-29-23 @ 15:39)  HR: 84 (09-29-23 @ 15:39) (84 - 84)  BP: 134/76 (09-29-23 @ 15:39) (134/76 - 134/76)  RR: 16 (09-29-23 @ 15:39) (16 - 16)  SpO2: 97% (09-29-23 @ 15:39) (97% - 97%)    General: Pt sitting comfortable in bed. No acute distress.  Head: Normocephalic. Atraumatic.   Skin: Warm, dry, intact. No evidence of rash.  Eyes: L pupil not perfectly circular but reactive. R pupil circular and reactive to light. EOMI.   ENMT: Oral mucosa with moist membranes.  Cardiac: Regular rate and rhythm. Normal S1 and S2.   Respiratory: Nonlabored breathing. Symmetric chest rise bilaterally.  GI: Abdomen soft. Nontender, nondistended.   Extremities: no lower extremity edema, distal pulses 2+ and symmetric.   Neurological: A&Ox3. CN II-XII grossly intact. +tenderness to deep palpation of L jaw. Facial movements symmetric. Tongue midline. B/L UE and LE 5/5 strength. Sensation to light touch bilaterally. No pronator drift.

## 2023-09-29 NOTE — ED ADULT TRIAGE NOTE - CHIEF COMPLAINT QUOTE
Pt presents to ED from outpatient MD office at [facility] C/O worsening L sided facial pain. Pt reports pain has been intermittent and lasted x 1 year. Also C/O nasal congestion. Sent to ED for admission to neurosurgery as per PA, Pt to be admitted for pain management and possible surgery for TMJ.

## 2023-09-29 NOTE — ED ADULT NURSE NOTE - NSFALLUNIVINTERV_ED_ALL_ED
Bed/Stretcher in lowest position, wheels locked, appropriate side rails in place/Call bell, personal items and telephone in reach/Instruct patient to call for assistance before getting out of bed/chair/stretcher/Non-slip footwear applied when patient is off stretcher/[place] to call system/Physically safe environment - no spills, clutter or unnecessary equipment/Purposeful proactive rounding/Room/bathroom lighting operational, light cord in reach

## 2023-09-29 NOTE — ED PROVIDER NOTE - ENMT, MLM
Airway patent, Nasal mucosa clear. Mouth with normal mucosa. Throat has no vesicles, no oropharyngeal exudates and uvula is midline., + L facial fullness w mild erythema, no fluctuance, tender to touch .

## 2023-09-29 NOTE — H&P ADULT - NSHPSOCIALHISTORY_GEN_ALL_CORE
Pt is from [place] and lives with wife. Currently living with daughter in [facility], lives on first floor of building (about 10 steps). Pt has 40 year history of smoking 7-9 cigarettes daily. Denies alcohol and drug use.

## 2023-09-29 NOTE — ED ADULT NURSE NOTE - CHIEF COMPLAINT QUOTE
Pt presents to ED from outpatient MD office at [facility] C/O worsening L sided facial pain. Pt reports pain has been intermittent and lasted x 1 year. Also C/O nasal congestion. Sent to ED for admission to neurosurgery as per PA, Pt to be admitted for pain management and possible surgery for TMJ.

## 2023-09-29 NOTE — ED ADULT NURSE NOTE - OBJECTIVE STATEMENT
73y male presents to ED c/o left facial pain. Pt states he has had left sided facial pain that is worse at night and described as burning. Referred to ED by PCP for possible TMJ procedure and pain management. States he has been taking gabapentin at home with partial relief. Speaking in full and complete sentences. Denies pmh. A&Ox4.

## 2023-09-29 NOTE — H&P ADULT - NSICDXPASTMEDICALHX_GEN_ALL_CORE_FT
PAST MEDICAL HISTORY:  COPD, mild     History of aortic aneurysm     Hyperlipidemia     Hypertension     Lung nodule     Trigeminal neuralgia

## 2023-09-29 NOTE — H&P ADULT - ASSESSMENT
72 yo L-handed M w/ PMHx of HTN, COPD, lung nodule, aortic aneurysm, trigeminal neuralgia s/p gamma knife 6/2023 to L trigeminal facial nerve w/o relief presents to the ED from outpatient office for evaluation of severe left facial pain.

## 2023-09-29 NOTE — ED PROVIDER NOTE - OBJECTIVE STATEMENT
72 yo w trigeminal neuralgia for one year, worsening and unresponsive to gabapentin, patient sent in by Dr. Staton for surgical intervention. location L side of face pain is constant w intermittent flares , no fever.

## 2023-09-29 NOTE — H&P ADULT - NSHPREVIEWOFSYSTEMS_GEN_ALL_CORE
Other than noted issue in HPI, patient states he currently has a sinus infection. Occasionally experiences SOB.

## 2023-09-29 NOTE — ED PROVIDER NOTE - CLINICAL SUMMARY MEDICAL DECISION MAKING FREE TEXT BOX
pt w persistent trigem neuralgia sent in by neurosurgery for surgical intervention / nerve rhizotomy   seen by neurosurgery / preop / admit

## 2023-09-30 LAB
A1C WITH ESTIMATED AVERAGE GLUCOSE RESULT: 6 % — HIGH (ref 4–5.6)
ANION GAP SERPL CALC-SCNC: 9 MMOL/L — SIGNIFICANT CHANGE UP (ref 5–17)
BLD GP AB SCN SERPL QL: NEGATIVE — SIGNIFICANT CHANGE UP
BUN SERPL-MCNC: 13 MG/DL — SIGNIFICANT CHANGE UP (ref 7–23)
CALCIUM SERPL-MCNC: 9.9 MG/DL — SIGNIFICANT CHANGE UP (ref 8.4–10.5)
CHLORIDE SERPL-SCNC: 103 MMOL/L — SIGNIFICANT CHANGE UP (ref 96–108)
CO2 SERPL-SCNC: 31 MMOL/L — SIGNIFICANT CHANGE UP (ref 22–31)
CREAT SERPL-MCNC: 0.76 MG/DL — SIGNIFICANT CHANGE UP (ref 0.5–1.3)
EGFR: 95 ML/MIN/1.73M2 — SIGNIFICANT CHANGE UP
ESTIMATED AVERAGE GLUCOSE: 126 MG/DL — HIGH (ref 68–114)
GLUCOSE SERPL-MCNC: 76 MG/DL — SIGNIFICANT CHANGE UP (ref 70–99)
HCT VFR BLD CALC: 47.8 % — SIGNIFICANT CHANGE UP (ref 39–50)
HCV AB S/CO SERPL IA: 0.04 S/CO — SIGNIFICANT CHANGE UP
HCV AB SERPL-IMP: SIGNIFICANT CHANGE UP
HGB BLD-MCNC: 15.7 G/DL — SIGNIFICANT CHANGE UP (ref 13–17)
MAGNESIUM SERPL-MCNC: 2.2 MG/DL — SIGNIFICANT CHANGE UP (ref 1.6–2.6)
MCHC RBC-ENTMCNC: 32 PG — SIGNIFICANT CHANGE UP (ref 27–34)
MCHC RBC-ENTMCNC: 32.8 GM/DL — SIGNIFICANT CHANGE UP (ref 32–36)
MCV RBC AUTO: 97.4 FL — SIGNIFICANT CHANGE UP (ref 80–100)
NRBC # BLD: 0 /100 WBCS — SIGNIFICANT CHANGE UP (ref 0–0)
PHOSPHATE SERPL-MCNC: 3.5 MG/DL — SIGNIFICANT CHANGE UP (ref 2.5–4.5)
PLATELET # BLD AUTO: 188 K/UL — SIGNIFICANT CHANGE UP (ref 150–400)
POTASSIUM SERPL-MCNC: 3.9 MMOL/L — SIGNIFICANT CHANGE UP (ref 3.5–5.3)
POTASSIUM SERPL-SCNC: 3.9 MMOL/L — SIGNIFICANT CHANGE UP (ref 3.5–5.3)
RBC # BLD: 4.91 M/UL — SIGNIFICANT CHANGE UP (ref 4.2–5.8)
RBC # FLD: 12.7 % — SIGNIFICANT CHANGE UP (ref 10.3–14.5)
RH IG SCN BLD-IMP: POSITIVE — SIGNIFICANT CHANGE UP
SODIUM SERPL-SCNC: 143 MMOL/L — SIGNIFICANT CHANGE UP (ref 135–145)
WBC # BLD: 5.08 K/UL — SIGNIFICANT CHANGE UP (ref 3.8–10.5)
WBC # FLD AUTO: 5.08 K/UL — SIGNIFICANT CHANGE UP (ref 3.8–10.5)

## 2023-09-30 PROCEDURE — 99223 1ST HOSP IP/OBS HIGH 75: CPT

## 2023-09-30 PROCEDURE — 99232 SBSQ HOSP IP/OBS MODERATE 35: CPT

## 2023-09-30 PROCEDURE — 70450 CT HEAD/BRAIN W/O DYE: CPT | Mod: 26

## 2023-09-30 RX ORDER — AMLODIPINE BESYLATE 2.5 MG/1
10 TABLET ORAL DAILY
Refills: 0 | Status: DISCONTINUED | OUTPATIENT
Start: 2023-09-30 | End: 2023-10-04

## 2023-09-30 RX ORDER — ACETAMINOPHEN 500 MG
1000 TABLET ORAL EVERY 8 HOURS
Refills: 0 | Status: DISCONTINUED | OUTPATIENT
Start: 2023-09-30 | End: 2023-10-04

## 2023-09-30 RX ORDER — LISINOPRIL 2.5 MG/1
30 TABLET ORAL DAILY
Refills: 0 | Status: DISCONTINUED | OUTPATIENT
Start: 2023-09-30 | End: 2023-10-03

## 2023-09-30 RX ORDER — INFLUENZA VIRUS VACCINE 15; 15; 15; 15 UG/.5ML; UG/.5ML; UG/.5ML; UG/.5ML
0.7 SUSPENSION INTRAMUSCULAR ONCE
Refills: 0 | Status: DISCONTINUED | OUTPATIENT
Start: 2023-09-30 | End: 2023-10-04

## 2023-09-30 RX ORDER — FLUTICASONE PROPIONATE 50 MCG
1 SPRAY, SUSPENSION NASAL
Refills: 0 | Status: DISCONTINUED | OUTPATIENT
Start: 2023-09-30 | End: 2023-10-04

## 2023-09-30 RX ORDER — DIAZEPAM 5 MG
2 TABLET ORAL EVERY 8 HOURS
Refills: 0 | Status: DISCONTINUED | OUTPATIENT
Start: 2023-09-30 | End: 2023-10-02

## 2023-09-30 RX ADMIN — Medication 1 PATCH: at 18:14

## 2023-09-30 RX ADMIN — Medication 650 MILLIGRAM(S): at 00:44

## 2023-09-30 RX ADMIN — Medication 1 SPRAY(S): at 05:11

## 2023-09-30 RX ADMIN — POLYETHYLENE GLYCOL 3350 17 GRAM(S): 17 POWDER, FOR SOLUTION ORAL at 11:47

## 2023-09-30 RX ADMIN — OXYCODONE HYDROCHLORIDE 10 MILLIGRAM(S): 5 TABLET ORAL at 07:50

## 2023-09-30 RX ADMIN — GABAPENTIN 600 MILLIGRAM(S): 400 CAPSULE ORAL at 16:02

## 2023-09-30 RX ADMIN — OXYCODONE HYDROCHLORIDE 10 MILLIGRAM(S): 5 TABLET ORAL at 00:14

## 2023-09-30 RX ADMIN — SENNA PLUS 2 TABLET(S): 8.6 TABLET ORAL at 21:09

## 2023-09-30 RX ADMIN — OXYCODONE HYDROCHLORIDE 10 MILLIGRAM(S): 5 TABLET ORAL at 06:50

## 2023-09-30 RX ADMIN — Medication 1 PATCH: at 11:47

## 2023-09-30 RX ADMIN — Medication 650 MILLIGRAM(S): at 01:59

## 2023-09-30 RX ADMIN — Medication 1000 MILLIGRAM(S): at 22:10

## 2023-09-30 RX ADMIN — Medication 1000 MILLIGRAM(S): at 21:10

## 2023-09-30 RX ADMIN — OXYCODONE HYDROCHLORIDE 10 MILLIGRAM(S): 5 TABLET ORAL at 22:10

## 2023-09-30 RX ADMIN — Medication 1 SPRAY(S): at 18:27

## 2023-09-30 RX ADMIN — LISINOPRIL 30 MILLIGRAM(S): 2.5 TABLET ORAL at 16:02

## 2023-09-30 RX ADMIN — GABAPENTIN 600 MILLIGRAM(S): 400 CAPSULE ORAL at 05:11

## 2023-09-30 RX ADMIN — Medication 4 MILLIGRAM(S): at 00:23

## 2023-09-30 RX ADMIN — Medication 150 MILLIGRAM(S): at 21:09

## 2023-09-30 RX ADMIN — ENOXAPARIN SODIUM 40 MILLIGRAM(S): 100 INJECTION SUBCUTANEOUS at 21:10

## 2023-09-30 RX ADMIN — OXYCODONE HYDROCHLORIDE 10 MILLIGRAM(S): 5 TABLET ORAL at 00:43

## 2023-09-30 RX ADMIN — OXYCODONE HYDROCHLORIDE 10 MILLIGRAM(S): 5 TABLET ORAL at 21:10

## 2023-09-30 NOTE — PROGRESS NOTE ADULT - SUBJECTIVE AND OBJECTIVE BOX
HPI:  72 y/o M w/ PMH of HTN, HLD (not on medication), COPD, lung nodule, aortic aneurysm, smoker (7-9 cigarettes per day x 40 years), trigeminal neuralgia s/p gamma knife to L trigeminal facial nerve w/o relief 6/2023 presents to [facility] from outpatient office for evaluation of severe L sided facial pain x1 year. Patient describes pain as located over the L cheek to jaw area, intermittently radiating to entire left face, occurs intermittent during the day but constant at night, varying from sharp and dull, rated 9/10 when severe, Episodes of pain typically last 45 minutes to 1 hour, has taken tylenol for pain without relief. Patient takes gabapentin 600mg tid at home, if pain is severe 4-5 times/day. Denies headaches, nausea, vomiting, dizziness, fever, chills, weakness, numbness, chest pain, vision changes, seizures, loss of consciousness, shortness of breath, palpitations, diarrhea, abdominal pain.  (29 Sep 2023 17:24)    S: Examined on floor by neurosurgery team. Denies any headache, vision changes, nausea, chest pain, SOB, abdominal pain. Endorsing left sided facial pain and some congestion.      HOSPITAL COURSE:  9/29: presented to ER from outpatient office for evaluation of severe left facial pain. Admitted to [facility] under neurosurgery  9/30: Endorsing pain on left side of face. ANNY    Vital Signs Last 24 Hrs  T(C): 36.4 (30 Sep 2023 02:44), Max: 36.5 (29 Sep 2023 19:05)  T(F): 97.5 (30 Sep 2023 02:44), Max: 97.7 (29 Sep 2023 19:05)  HR: 66 (30 Sep 2023 02:44) (66 - 84)  BP: 138/77 (30 Sep 2023 02:44) (123/60 - 138/78)  BP(mean): --  RR: 16 (30 Sep 2023 02:44) (16 - 16)  SpO2: 93% (30 Sep 2023 02:44) (93% - 97%)    Parameters below as of 30 Sep 2023 02:44  Patient On (Oxygen Delivery Method): room air          PHYSICAL EXAM:  General: Alert and awake. Lying in bed. Not in any acute distress.  EENT: Sclera white, conjunctiva noninjected.   Neuro: A&Ox3. Conversational in Syriac and English. PERRL, 3mm brisk. EOMI, no nystagmus. Facial sensation intact b/l, jaw able to open and shut without pain. Pain on v1-v3 branches with deep palpation. Face symmetric. Tongue sticks out midline, uvula rises symmetrically. Shrugs shoulders against resistance.  Sensation intact to light touch b/l.  Motor: 5/5 in all extremities  Cardiac: RRR  Lungs: Chest rises symmetrically. Non-labored breathing on room air.  GI: Abdomen soft and nontender, nondistended.        DIET:  [] NPO  [x] Mechanical  [] Tube feeds    LABS:                        13.8   5.24  )-----------( 158      ( 29 Sep 2023 16:24 )             39.9     09-29    138  |  103  |  13  ----------------------------<  101<H>  4.0   |  26  |  0.79    Ca    9.3      29 Sep 2023 16:24    TPro  6.6  /  Alb  4.0  /  TBili  <0.2  /  DBili  x   /  AST  18  /  ALT  10  /  AlkPhos  61  09-29    PT/INR - ( 29 Sep 2023 16:24 )   PT: 10.4 sec;   INR: 0.91          PTT - ( 29 Sep 2023 16:24 )  PTT:38.3 sec  Urinalysis Basic - ( 29 Sep 2023 16:24 )    Color: x / Appearance: x / SG: x / pH: x  Gluc: 101 mg/dL / Ketone: x  / Bili: x / Urobili: x   Blood: x / Protein: x / Nitrite: x   Leuk Esterase: x / RBC: x / WBC x   Sq Epi: x / Non Sq Epi: x / Bacteria: x          Allergies    Pineapple (Unknown)  No Known Drug Allergies    Intolerances      MEDICATIONS:  Antibiotics:    Neuro:  acetaminophen     Tablet .. 650 milliGRAM(s) Oral every 6 hours PRN  gabapentin 600 milliGRAM(s) Oral every 8 hours  oxyCODONE    IR 5 milliGRAM(s) Oral every 6 hours PRN  oxyCODONE    IR 10 milliGRAM(s) Oral every 6 hours PRN    Anticoagulation:  enoxaparin Injectable 40 milliGRAM(s) SubCutaneous <User Schedule>    OTHER:  fluticasone propionate 50 MICROgram(s)/spray Nasal Spray 1 Spray(s) Both Nostrils two times a day  influenza  Vaccine (HIGH DOSE) 0.7 milliLiter(s) IntraMuscular once  nicotine - 21 mG/24Hr(s) Patch 1 Patch Transdermal daily  polyethylene glycol 3350 17 Gram(s) Oral daily  senna 2 Tablet(s) Oral at bedtime      ASSESSMENT:  73-year-old male with PMH HTN, HLD, COPD, smoker (7-9 cigarettes per day x 40 years), aortic aneurysm, lung nodule s/p lung surgery and trigeminal neuralgia s/p gamma knife to L trigeminal facial nerve w/o relief 6/2023 presents to [facility] from outpatient office for evaluation of severe L sided facial pain x1 year.          Neuro:  -neuro/vitals q4  -pauin control prn  -pending MR Brain w/wo and fiesta sequence and everette  -pending med clearance  -preop for OR 10/4 for trigeminal rhizotomy  -c/w home gabapentin    Cardio:  -normotensive BP goal  -pending echo   -pending cards clearance    Pulm:  -satting well on RA    GI:  -regular diet  -bowel regimen    Renal:  -IVL, voiding    Endo:  -f/u A1c    Heme:  -SCDs for DVT ppx    ID:  -afebrile    MISC:  - flonase BID for nasal congestion    Dispo: regional status, full code, pending OR 10/4     D/w Dr. Staton HPI:  74 y/o M w/ PMH of HTN, HLD (not on medication), COPD, lung nodule, aortic aneurysm, smoker (7-9 cigarettes per day x 40 years), trigeminal neuralgia s/p gamma knife to L trigeminal facial nerve w/o relief 6/2023 presents to [facility] from outpatient office for evaluation of severe L sided facial pain x1 year. Patient describes pain as located over the L cheek to jaw area, intermittently radiating to entire left face, occurs intermittent during the day but constant at night, varying from sharp and dull, rated 9/10 when severe, Episodes of pain typically last 45 minutes to 1 hour, has taken tylenol for pain without relief. Patient takes gabapentin 600mg tid at home, if pain is severe 4-5 times/day. Denies headaches, nausea, vomiting, dizziness, fever, chills, weakness, numbness, chest pain, vision changes, seizures, loss of consciousness, shortness of breath, palpitations, diarrhea, abdominal pain.  (29 Sep 2023 17:24)    S: Examined on floor by neurosurgery team. Denies any headache, vision changes, nausea, chest pain, SOB, abdominal pain. Endorsing left sided facial pain and some congestion.      HOSPITAL COURSE:  9/29: presented to ER from outpatient office for evaluation of severe left facial pain. Admitted to [facility] under neurosurgery  9/30: Endorsing pain on left side of face. ANNY    Vital Signs Last 24 Hrs  T(C): 36.4 (30 Sep 2023 02:44), Max: 36.5 (29 Sep 2023 19:05)  T(F): 97.5 (30 Sep 2023 02:44), Max: 97.7 (29 Sep 2023 19:05)  HR: 66 (30 Sep 2023 02:44) (66 - 84)  BP: 138/77 (30 Sep 2023 02:44) (123/60 - 138/78)  BP(mean): --  RR: 16 (30 Sep 2023 02:44) (16 - 16)  SpO2: 93% (30 Sep 2023 02:44) (93% - 97%)    Parameters below as of 30 Sep 2023 02:44  Patient On (Oxygen Delivery Method): room air          PHYSICAL EXAM:  General: Alert and awake. Lying in bed. Not in any acute distress.  EENT: Sclera white, conjunctiva noninjected.   Neuro: A&Ox3. Conversational in Sami and English. PERRL, 3mm brisk. EOMI, no nystagmus. Facial sensation intact b/l, jaw able to open and shut without pain. Pain on v1-v3 branches with deep palpation. Face symmetric. Tongue sticks out midline, uvula rises symmetrically. Shrugs shoulders against resistance.  Sensation intact to light touch b/l.  Motor: 5/5 in all extremities  Cardiac: RRR  Lungs: Chest rises symmetrically. Non-labored breathing on room air.  GI: Abdomen soft and nontender, nondistended.        DIET:  [] NPO  [x] Mechanical  [] Tube feeds    LABS:                        13.8   5.24  )-----------( 158      ( 29 Sep 2023 16:24 )             39.9     09-29    138  |  103  |  13  ----------------------------<  101<H>  4.0   |  26  |  0.79    Ca    9.3      29 Sep 2023 16:24    TPro  6.6  /  Alb  4.0  /  TBili  <0.2  /  DBili  x   /  AST  18  /  ALT  10  /  AlkPhos  61  09-29    PT/INR - ( 29 Sep 2023 16:24 )   PT: 10.4 sec;   INR: 0.91          PTT - ( 29 Sep 2023 16:24 )  PTT:38.3 sec  Urinalysis Basic - ( 29 Sep 2023 16:24 )    Color: x / Appearance: x / SG: x / pH: x  Gluc: 101 mg/dL / Ketone: x  / Bili: x / Urobili: x   Blood: x / Protein: x / Nitrite: x   Leuk Esterase: x / RBC: x / WBC x   Sq Epi: x / Non Sq Epi: x / Bacteria: x          Allergies    Pineapple (Unknown)  No Known Drug Allergies    Intolerances      MEDICATIONS:  Antibiotics:    Neuro:  acetaminophen     Tablet .. 650 milliGRAM(s) Oral every 6 hours PRN  gabapentin 600 milliGRAM(s) Oral every 8 hours  oxyCODONE    IR 5 milliGRAM(s) Oral every 6 hours PRN  oxyCODONE    IR 10 milliGRAM(s) Oral every 6 hours PRN    Anticoagulation:  enoxaparin Injectable 40 milliGRAM(s) SubCutaneous <User Schedule>    OTHER:  fluticasone propionate 50 MICROgram(s)/spray Nasal Spray 1 Spray(s) Both Nostrils two times a day  influenza  Vaccine (HIGH DOSE) 0.7 milliLiter(s) IntraMuscular once  nicotine - 21 mG/24Hr(s) Patch 1 Patch Transdermal daily  polyethylene glycol 3350 17 Gram(s) Oral daily  senna 2 Tablet(s) Oral at bedtime      ASSESSMENT:  73-year-old male with PMH HTN, HLD, COPD, smoker (7-9 cigarettes per day x 40 years), aortic aneurysm, lung nodule s/p lung surgery and trigeminal neuralgia s/p gamma knife to L trigeminal facial nerve w/o relief 6/2023 presents to [facility] from outpatient office for evaluation of severe L sided facial pain x1 year.          Neuro:  -neuro/vitals q4  -pauin control prn  -pending MR Brain w/wo and fiesta sequence and everette  -pending med clearance  -preop for OR 10/4 for trigeminal rhizotomy  -c/w home gabapentin    Cardio:  -normotensive BP goal  -pending echo   -pending cards clearance    Pulm:  -satting well on RA    GI:  -regular diet  -bowel regimen    Renal:  -IVL, voiding    Endo:  -f/u A1c    Heme:  -SCDs + SQL for DVT ppx    ID:  -afebrile    MISC:  - flonase BID for nasal congestion  -nicotine patch      Dispo: regional status, full code, pending OR 10/4     D/w Dr. Staton HPI:  72 y/o M w/ PMH of HTN, HLD (not on medication), COPD, lung nodule, aortic aneurysm, smoker (7-9 cigarettes per day x 40 years), trigeminal neuralgia s/p gamma knife to L trigeminal facial nerve w/o relief 6/2023 presents to [facility] from outpatient office for evaluation of severe L sided facial pain x1 year. Patient describes pain as located over the L cheek to jaw area, intermittently radiating to entire left face, occurs intermittent during the day but constant at night, varying from sharp and dull, rated 9/10 when severe, Episodes of pain typically last 45 minutes to 1 hour, has taken tylenol for pain without relief. Patient takes gabapentin 600mg tid at home, if pain is severe 4-5 times/day. Denies headaches, nausea, vomiting, dizziness, fever, chills, weakness, numbness, chest pain, vision changes, seizures, loss of consciousness, shortness of breath, palpitations, diarrhea, abdominal pain.  (29 Sep 2023 17:24)    S: Examined on floor by neurosurgery team. Denies any headache, vision changes, nausea, chest pain, SOB, abdominal pain. Endorsing left sided facial pain and some congestion. Flonase started.       HOSPITAL COURSE:  9/29: presented to ER from outpatient office for evaluation of severe left facial pain. Admitted to [facility] under neurosurgery  9/30: Endorsing pain on left side of face. ANNY    Vital Signs Last 24 Hrs  T(C): 36.4 (30 Sep 2023 02:44), Max: 36.5 (29 Sep 2023 19:05)  T(F): 97.5 (30 Sep 2023 02:44), Max: 97.7 (29 Sep 2023 19:05)  HR: 66 (30 Sep 2023 02:44) (66 - 84)  BP: 138/77 (30 Sep 2023 02:44) (123/60 - 138/78)  BP(mean): --  RR: 16 (30 Sep 2023 02:44) (16 - 16)  SpO2: 93% (30 Sep 2023 02:44) (93% - 97%)    Parameters below as of 30 Sep 2023 02:44  Patient On (Oxygen Delivery Method): room air          PHYSICAL EXAM:  General: Alert and awake. Lying in bed. Not in any acute distress.  EENT: Sclera white, conjunctiva noninjected.   Neuro: A&Ox3. Conversational in Estonian and English. PERRL, 3mm brisk. EOMI, no nystagmus. Facial sensation intact b/l, jaw able to open and shut without pain. Pain on v1-v3 branches with deep palpation. Face symmetric. Tongue sticks out midline, uvula rises symmetrically. Shrugs shoulders against resistance.  Sensation intact to light touch b/l.  Motor: 5/5 in all extremities  Cardiac: RRR  Lungs: Chest rises symmetrically. Non-labored breathing on room air.  GI: Abdomen soft and nontender, nondistended.        DIET:  [] NPO  [x] Mechanical  [] Tube feeds    LABS:                        13.8   5.24  )-----------( 158      ( 29 Sep 2023 16:24 )             39.9     09-29    138  |  103  |  13  ----------------------------<  101<H>  4.0   |  26  |  0.79    Ca    9.3      29 Sep 2023 16:24    TPro  6.6  /  Alb  4.0  /  TBili  <0.2  /  DBili  x   /  AST  18  /  ALT  10  /  AlkPhos  61  09-29    PT/INR - ( 29 Sep 2023 16:24 )   PT: 10.4 sec;   INR: 0.91          PTT - ( 29 Sep 2023 16:24 )  PTT:38.3 sec  Urinalysis Basic - ( 29 Sep 2023 16:24 )    Color: x / Appearance: x / SG: x / pH: x  Gluc: 101 mg/dL / Ketone: x  / Bili: x / Urobili: x   Blood: x / Protein: x / Nitrite: x   Leuk Esterase: x / RBC: x / WBC x   Sq Epi: x / Non Sq Epi: x / Bacteria: x          Allergies    Pineapple (Unknown)  No Known Drug Allergies    Intolerances      MEDICATIONS:  Antibiotics:    Neuro:  acetaminophen     Tablet .. 650 milliGRAM(s) Oral every 6 hours PRN  gabapentin 600 milliGRAM(s) Oral every 8 hours  oxyCODONE    IR 5 milliGRAM(s) Oral every 6 hours PRN  oxyCODONE    IR 10 milliGRAM(s) Oral every 6 hours PRN    Anticoagulation:  enoxaparin Injectable 40 milliGRAM(s) SubCutaneous <User Schedule>    OTHER:  fluticasone propionate 50 MICROgram(s)/spray Nasal Spray 1 Spray(s) Both Nostrils two times a day  influenza  Vaccine (HIGH DOSE) 0.7 milliLiter(s) IntraMuscular once  nicotine - 21 mG/24Hr(s) Patch 1 Patch Transdermal daily  polyethylene glycol 3350 17 Gram(s) Oral daily  senna 2 Tablet(s) Oral at bedtime      ASSESSMENT:  73-year-old male with PMH HTN, HLD, COPD, smoker (7-9 cigarettes per day x 40 years), aortic aneurysm, lung nodule s/p lung surgery and trigeminal neuralgia s/p gamma knife to L trigeminal facial nerve w/o relief 6/2023 presents to [facility] from outpatient office for evaluation of severe L sided facial pain x1 year.          Neuro:  -neuro/vitals q4  -pauin control prn  -pending MR Brain w/wo and fiesta sequence and everette  -pending med clearance  -preop for OR 10/4 for trigeminal rhizotomy  -c/w home gabapentin    Cardio:  -normotensive BP goal  -pending echo   -pending cards clearance    Pulm:  -satting well on RA  - nicotine patch for h/o smoking    GI:  -regular diet  -bowel regimen    Renal:  -IVL, voiding    Endo:  -f/u A1c    Heme:  -SCDs and  SQL for DVT ppx    ID:  -afebrile    MISC:  - flonase BID for nasal congestion  -nicotine patch      Dispo: regional status, full code, pending OR 10/4     D/w Dr. Staton

## 2023-09-30 NOTE — CONSULT NOTE ADULT - SUBJECTIVE AND OBJECTIVE BOX
SUBJECTIVE / INTERVAL HPI: Patient seen and examined at bedside.     VITAL SIGNS:  Vital Signs Last 24 Hrs  T(C): 36.7 (30 Sep 2023 08:51), Max: 36.7 (30 Sep 2023 08:51)  T(F): 98.1 (30 Sep 2023 08:51), Max: 98.1 (30 Sep 2023 08:51)  HR: 71 (30 Sep 2023 08:51) (66 - 84)  BP: 150/80 (30 Sep 2023 08:51) (123/60 - 150/80)  BP(mean): --  RR: 18 (30 Sep 2023 08:51) (16 - 18)  SpO2: 97% (30 Sep 2023 08:51) (93% - 97%)    Parameters below as of 30 Sep 2023 08:51  Patient On (Oxygen Delivery Method): room air        PHYSICAL EXAM:    General: WDWN  HEENT: NC/AT; PERRL, anicteric sclera; MMM  Neck: supple  Cardiovascular: +S1/S2; RRR  Respiratory: CTA B/L; no W/R/R  Gastrointestinal: soft, NT/ND; +BSx4  Extremities: WWP; no edema, clubbing or cyanosis  Vascular: 2+ radial, DP/PT pulses B/L  Neurological: AAOx3; no focal deficits    MEDICATIONS:  MEDICATIONS  (STANDING):  enoxaparin Injectable 40 milliGRAM(s) SubCutaneous <User Schedule>  fluticasone propionate 50 MICROgram(s)/spray Nasal Spray 1 Spray(s) Both Nostrils two times a day  gabapentin 600 milliGRAM(s) Oral every 8 hours  influenza  Vaccine (HIGH DOSE) 0.7 milliLiter(s) IntraMuscular once  nicotine - 21 mG/24Hr(s) Patch 1 Patch Transdermal daily  polyethylene glycol 3350 17 Gram(s) Oral daily  senna 2 Tablet(s) Oral at bedtime    MEDICATIONS  (PRN):  acetaminophen     Tablet .. 650 milliGRAM(s) Oral every 6 hours PRN Mild Pain (1 - 3)  oxyCODONE    IR 5 milliGRAM(s) Oral every 6 hours PRN Moderate Pain (4 - 6)  oxyCODONE    IR 10 milliGRAM(s) Oral every 6 hours PRN Severe Pain (7 - 10)      ALLERGIES:  Allergies    Pineapple (Unknown)  No Known Drug Allergies    Intolerances        LABS:                        13.8   5.24  )-----------( 158      ( 29 Sep 2023 16:24 )             39.9     09-29    138  |  103  |  13  ----------------------------<  101<H>  4.0   |  26  |  0.79    Ca    9.3      29 Sep 2023 16:24    TPro  6.6  /  Alb  4.0  /  TBili  <0.2  /  DBili  x   /  AST  18  /  ALT  10  /  AlkPhos  61  09-29    PT/INR - ( 29 Sep 2023 16:24 )   PT: 10.4 sec;   INR: 0.91          PTT - ( 29 Sep 2023 16:24 )  PTT:38.3 sec  Urinalysis Basic - ( 29 Sep 2023 16:24 )    Color: x / Appearance: x / SG: x / pH: x  Gluc: 101 mg/dL / Ketone: x  / Bili: x / Urobili: x   Blood: x / Protein: x / Nitrite: x   Leuk Esterase: x / RBC: x / WBC x   Sq Epi: x / Non Sq Epi: x / Bacteria: x      CAPILLARY BLOOD GLUCOSE          RADIOLOGY & ADDITIONAL TESTS: Reviewed.    ASSESSMENT:    PLAN:  HPI:  74 y/o M w/ PMH of HTN, HLD (not on medication), COPD, lung nodule, aortic aneurysm, smoker (7-9 cigarettes per day x 40 years), trigeminal neuralgia s/p gamma knife to L trigeminal facial nerve w/o relief 6/2023 presents to [facility] from outpatient office for evaluation of severe L sided facial pain x1 year. He is being admitted with trigeminal neuralgia with plans for trigeminal rhizotomy on 10/4. Medicine consulted for pre operative evaluation as well as comanagement.     On evaluation, patient states that he has had these symptoms for a long time. He denies any chest pain, dyspnea, orthopnea, wheezing, etc. He states he is able to walk several blocks and up flights of stairs without any difficulty. He denies ever having a history of CAD or any prior ischemic evaluation. Of note, patient has COPD but states that it is well controlled on flonase only. He also was at [place] reportedly 2 months ago and was told he has an aortic aneurysm - but that it was not large enough to require operation.     VITAL SIGNS:  Vital Signs Last 24 Hrs  T(C): 36.7 (30 Sep 2023 08:51), Max: 36.7 (30 Sep 2023 08:51)  T(F): 98.1 (30 Sep 2023 08:51), Max: 98.1 (30 Sep 2023 08:51)  HR: 71 (30 Sep 2023 08:51) (66 - 84)  BP: 150/80 (30 Sep 2023 08:51) (123/60 - 150/80)  BP(mean): --  RR: 18 (30 Sep 2023 08:51) (16 - 18)  SpO2: 97% (30 Sep 2023 08:51) (93% - 97%)    Parameters below as of 30 Sep 2023 08:51  Patient On (Oxygen Delivery Method): room air        PHYSICAL EXAM:    General: WDWN  HEENT: NC/AT; PERRL, anicteric sclera; MMM  Neck: supple  Cardiovascular: +S1/S2; RRR  Respiratory: CTA B/L; no W/R/R  Gastrointestinal: soft, NT/ND; +BSx4  Extremities: WWP; no edema, clubbing or cyanosis  Vascular: 2+ radial, DP/PT pulses B/L  Neurological: AAOx3; no focal deficits    MEDICATIONS:  MEDICATIONS  (STANDING):  enoxaparin Injectable 40 milliGRAM(s) SubCutaneous <User Schedule>  fluticasone propionate 50 MICROgram(s)/spray Nasal Spray 1 Spray(s) Both Nostrils two times a day  gabapentin 600 milliGRAM(s) Oral every 8 hours  influenza  Vaccine (HIGH DOSE) 0.7 milliLiter(s) IntraMuscular once  nicotine - 21 mG/24Hr(s) Patch 1 Patch Transdermal daily  polyethylene glycol 3350 17 Gram(s) Oral daily  senna 2 Tablet(s) Oral at bedtime    MEDICATIONS  (PRN):  acetaminophen     Tablet .. 650 milliGRAM(s) Oral every 6 hours PRN Mild Pain (1 - 3)  oxyCODONE    IR 5 milliGRAM(s) Oral every 6 hours PRN Moderate Pain (4 - 6)  oxyCODONE    IR 10 milliGRAM(s) Oral every 6 hours PRN Severe Pain (7 - 10)      ALLERGIES:  Allergies    Pineapple (Unknown)  No Known Drug Allergies    Intolerances        LABS:                        13.8   5.24  )-----------( 158      ( 29 Sep 2023 16:24 )             39.9     09-29    138  |  103  |  13  ----------------------------<  101<H>  4.0   |  26  |  0.79    Ca    9.3      29 Sep 2023 16:24    TPro  6.6  /  Alb  4.0  /  TBili  <0.2  /  DBili  x   /  AST  18  /  ALT  10  /  AlkPhos  61  09-29    PT/INR - ( 29 Sep 2023 16:24 )   PT: 10.4 sec;   INR: 0.91          PTT - ( 29 Sep 2023 16:24 )  PTT:38.3 sec  Urinalysis Basic - ( 29 Sep 2023 16:24 )    Color: x / Appearance: x / SG: x / pH: x  Gluc: 101 mg/dL / Ketone: x  / Bili: x / Urobili: x   Blood: x / Protein: x / Nitrite: x   Leuk Esterase: x / RBC: x / WBC x   Sq Epi: x / Non Sq Epi: x / Bacteria: x      CAPILLARY BLOOD GLUCOSE          RADIOLOGY & ADDITIONAL TESTS: Reviewed.    ASSESSMENT:    PLAN:

## 2023-10-01 LAB
ANION GAP SERPL CALC-SCNC: 9 MMOL/L — SIGNIFICANT CHANGE UP (ref 5–17)
BUN SERPL-MCNC: 14 MG/DL — SIGNIFICANT CHANGE UP (ref 7–23)
CALCIUM SERPL-MCNC: 9.2 MG/DL — SIGNIFICANT CHANGE UP (ref 8.4–10.5)
CHLORIDE SERPL-SCNC: 106 MMOL/L — SIGNIFICANT CHANGE UP (ref 96–108)
CO2 SERPL-SCNC: 25 MMOL/L — SIGNIFICANT CHANGE UP (ref 22–31)
CREAT SERPL-MCNC: 0.8 MG/DL — SIGNIFICANT CHANGE UP (ref 0.5–1.3)
EGFR: 93 ML/MIN/1.73M2 — SIGNIFICANT CHANGE UP
GLUCOSE SERPL-MCNC: 94 MG/DL — SIGNIFICANT CHANGE UP (ref 70–99)
HCT VFR BLD CALC: 41.9 % — SIGNIFICANT CHANGE UP (ref 39–50)
HGB BLD-MCNC: 13.9 G/DL — SIGNIFICANT CHANGE UP (ref 13–17)
MAGNESIUM SERPL-MCNC: 2.2 MG/DL — SIGNIFICANT CHANGE UP (ref 1.6–2.6)
MCHC RBC-ENTMCNC: 32.3 PG — SIGNIFICANT CHANGE UP (ref 27–34)
MCHC RBC-ENTMCNC: 33.2 GM/DL — SIGNIFICANT CHANGE UP (ref 32–36)
MCV RBC AUTO: 97.2 FL — SIGNIFICANT CHANGE UP (ref 80–100)
NRBC # BLD: 0 /100 WBCS — SIGNIFICANT CHANGE UP (ref 0–0)
PHOSPHATE SERPL-MCNC: 3.9 MG/DL — SIGNIFICANT CHANGE UP (ref 2.5–4.5)
PLATELET # BLD AUTO: 162 K/UL — SIGNIFICANT CHANGE UP (ref 150–400)
POTASSIUM SERPL-MCNC: 4.5 MMOL/L — SIGNIFICANT CHANGE UP (ref 3.5–5.3)
POTASSIUM SERPL-SCNC: 4.5 MMOL/L — SIGNIFICANT CHANGE UP (ref 3.5–5.3)
RBC # BLD: 4.31 M/UL — SIGNIFICANT CHANGE UP (ref 4.2–5.8)
RBC # FLD: 12.3 % — SIGNIFICANT CHANGE UP (ref 10.3–14.5)
SODIUM SERPL-SCNC: 140 MMOL/L — SIGNIFICANT CHANGE UP (ref 135–145)
WBC # BLD: 4.39 K/UL — SIGNIFICANT CHANGE UP (ref 3.8–10.5)
WBC # FLD AUTO: 4.39 K/UL — SIGNIFICANT CHANGE UP (ref 3.8–10.5)

## 2023-10-01 PROCEDURE — 99232 SBSQ HOSP IP/OBS MODERATE 35: CPT

## 2023-10-01 RX ORDER — LACTULOSE 10 G/15ML
20 SOLUTION ORAL ONCE
Refills: 0 | Status: COMPLETED | OUTPATIENT
Start: 2023-10-01 | End: 2023-10-01

## 2023-10-01 RX ADMIN — Medication 150 MILLIGRAM(S): at 05:49

## 2023-10-01 RX ADMIN — Medication 1000 MILLIGRAM(S): at 22:08

## 2023-10-01 RX ADMIN — Medication 150 MILLIGRAM(S): at 21:08

## 2023-10-01 RX ADMIN — LISINOPRIL 30 MILLIGRAM(S): 2.5 TABLET ORAL at 12:07

## 2023-10-01 RX ADMIN — Medication 1 SPRAY(S): at 05:49

## 2023-10-01 RX ADMIN — Medication 1 PATCH: at 11:45

## 2023-10-01 RX ADMIN — Medication 1000 MILLIGRAM(S): at 05:50

## 2023-10-01 RX ADMIN — OXYCODONE HYDROCHLORIDE 10 MILLIGRAM(S): 5 TABLET ORAL at 21:08

## 2023-10-01 RX ADMIN — Medication 1 SPRAY(S): at 17:16

## 2023-10-01 RX ADMIN — POLYETHYLENE GLYCOL 3350 17 GRAM(S): 17 POWDER, FOR SOLUTION ORAL at 11:44

## 2023-10-01 RX ADMIN — OXYCODONE HYDROCHLORIDE 10 MILLIGRAM(S): 5 TABLET ORAL at 12:41

## 2023-10-01 RX ADMIN — ENOXAPARIN SODIUM 40 MILLIGRAM(S): 100 INJECTION SUBCUTANEOUS at 21:09

## 2023-10-01 RX ADMIN — Medication 1000 MILLIGRAM(S): at 16:34

## 2023-10-01 RX ADMIN — Medication 1000 MILLIGRAM(S): at 06:50

## 2023-10-01 RX ADMIN — OXYCODONE HYDROCHLORIDE 10 MILLIGRAM(S): 5 TABLET ORAL at 05:49

## 2023-10-01 RX ADMIN — Medication 1 PATCH: at 07:28

## 2023-10-01 RX ADMIN — Medication 1000 MILLIGRAM(S): at 21:08

## 2023-10-01 RX ADMIN — OXYCODONE HYDROCHLORIDE 10 MILLIGRAM(S): 5 TABLET ORAL at 22:08

## 2023-10-01 RX ADMIN — OXYCODONE HYDROCHLORIDE 10 MILLIGRAM(S): 5 TABLET ORAL at 06:49

## 2023-10-01 RX ADMIN — AMLODIPINE BESYLATE 10 MILLIGRAM(S): 2.5 TABLET ORAL at 05:50

## 2023-10-01 RX ADMIN — Medication 1000 MILLIGRAM(S): at 15:34

## 2023-10-01 RX ADMIN — Medication 150 MILLIGRAM(S): at 15:34

## 2023-10-01 RX ADMIN — OXYCODONE HYDROCHLORIDE 10 MILLIGRAM(S): 5 TABLET ORAL at 11:41

## 2023-10-01 RX ADMIN — LACTULOSE 20 GRAM(S): 10 SOLUTION ORAL at 09:24

## 2023-10-01 NOTE — PROGRESS NOTE ADULT - SUBJECTIVE AND OBJECTIVE BOX
HPI:  74 y/o M w/ PMH of HTN, HLD (not on medication), COPD, lung nodule, aortic aneurysm, smoker (7-9 cigarettes per day x 40 years), trigeminal neuralgia s/p gamma knife to L trigeminal facial nerve w/o relief 6/2023 presents to [facility] from outpatient office for evaluation of severe L sided facial pain x1 year. Patient describes pain as located over the L cheek to jaw area, intermittently radiating to entire left face, occurs intermittent during the day but constant at night, varying from sharp and dull, rated 9/10 when severe, Episodes of pain typically last 45 minutes to 1 hour, has taken tylenol for pain without relief. Patient takes gabapentin 600mg tid at home, if pain is severe 4-5 times/day. Denies headaches, nausea, vomiting, dizziness, fever, chills, weakness, numbness, chest pain, vision changes, seizures, loss of consciousness, shortness of breath, palpitations, diarrhea, abdominal pain.  (29 Sep 2023 17:24)      Subjective:  Pt reports that facial pain is a little improved after changing gabapentin to lyrica.     Hospital course:  9/29: presented to ER from outpatient office for evaluation of severe left facial pain. Admitted to [facility] under neurosurgery. MRI completed.   9/30: Endorsing severe pain on left side of face. ANNY. Resumed home amlodipine and lisinopril. CT everette completed. Pain management consulted, recommended starting tylenol 1gq8, lyrica 150q8, valium 2q8 prn, d/c gabapentin.   10/1: ANNY overnight. Pending TTE    Vital Signs Last 24 Hrs  T(C): 36.6 (30 Sep 2023 20:29), Max: 36.7 (30 Sep 2023 08:51)  T(F): 97.9 (30 Sep 2023 20:29), Max: 98.1 (30 Sep 2023 08:51)  HR: 77 (30 Sep 2023 20:29) (66 - 80)  BP: 121/72 (30 Sep 2023 20:29) (121/72 - 150/82)  BP(mean): --  RR: 18 (30 Sep 2023 20:29) (16 - 18)  SpO2: 94% (30 Sep 2023 20:29) (93% - 98%)    Parameters below as of 30 Sep 2023 20:29  Patient On (Oxygen Delivery Method): room air        I&O's Summary      PHYSICAL EXAM:  General: patient seen laying supine in bed in NAD  Neuro: AAOx3, follows commands, opens eyes spontaneously, speech clear and fluent, CNII-XI grossly intact, face symmetric, no pronator drift, strength 5/5 b/l upper extremities and lower extremities, sensation intact to light touch throughout  HEENT: PERRL, Left pupil irregularly shaped,  EOMI  Neck: supple  Cardiac: RRR, S1S2  Pulmonary: chest rise symmetric  Abdomen: soft, nontender, nondistended  Ext: perfusing well  Skin: warm, dry          DIET:  [] NPO  [x] Mechanical  [] Tube feeds    LABS:                        15.7   5.08  )-----------( 188      ( 30 Sep 2023 14:07 )             47.8     09-30    143  |  103  |  13  ----------------------------<  76  3.9   |  31  |  0.76    Ca    9.9      30 Sep 2023 14:06  Phos  3.5     09-30  Mg     2.2     09-30    TPro  6.6  /  Alb  4.0  /  TBili  <0.2  /  DBili  x   /  AST  18  /  ALT  10  /  AlkPhos  61  09-29    PT/INR - ( 29 Sep 2023 16:24 )   PT: 10.4 sec;   INR: 0.91          PTT - ( 29 Sep 2023 16:24 )  PTT:38.3 sec  Urinalysis Basic - ( 30 Sep 2023 14:06 )    Color: x / Appearance: x / SG: x / pH: x  Gluc: 76 mg/dL / Ketone: x  / Bili: x / Urobili: x   Blood: x / Protein: x / Nitrite: x   Leuk Esterase: x / RBC: x / WBC x   Sq Epi: x / Non Sq Epi: x / Bacteria: x          CAPILLARY BLOOD GLUCOSE          Drug Levels: [] N/A    CSF Analysis: [] N/A      Allergies    Pineapple (Unknown)  No Known Drug Allergies    Intolerances      MEDICATIONS:  Antibiotics:    Neuro:  acetaminophen     Tablet .. 1000 milliGRAM(s) Oral every 8 hours  diazepam    Tablet 2 milliGRAM(s) Oral every 8 hours PRN  oxyCODONE    IR 10 milliGRAM(s) Oral every 6 hours PRN  oxyCODONE    IR 5 milliGRAM(s) Oral every 6 hours PRN  pregabalin 150 milliGRAM(s) Oral every 8 hours    Anticoagulation:  enoxaparin Injectable 40 milliGRAM(s) SubCutaneous <User Schedule>    OTHER:  amLODIPine   Tablet 10 milliGRAM(s) Oral daily  fluticasone propionate 50 MICROgram(s)/spray Nasal Spray 1 Spray(s) Both Nostrils two times a day  influenza  Vaccine (HIGH DOSE) 0.7 milliLiter(s) IntraMuscular once  lisinopril 30 milliGRAM(s) Oral daily  nicotine - 21 mG/24Hr(s) Patch 1 Patch Transdermal daily  polyethylene glycol 3350 17 Gram(s) Oral daily  senna 2 Tablet(s) Oral at bedtime    IVF:    CULTURES:    RADIOLOGY & ADDITIONAL TESTS:    TRIGEMINAL NEURALGIA    Handoff    MEWS Score    No pertinent past medical history    Hypertension    Hyperlipidemia    Trigeminal neuralgia    Lung nodule    History of aortic aneurysm    COPD, mild    Trigeminal neuralgia    Trigeminal neuralgia    History of lung surgery    FACIAL PAIN    90+    [email or web address]        ASSESSMENT:  74 yo L-handed M w/ PMHx of HTN, COPD, lung nodule, aortic aneurysm, trigeminal neuralgia s/p gamma knife 6/2023 to L trigeminal facial nerve w/o relief presents to the ED from outpatient office for evaluation of severe left facial pain.     Plan:    Neuro:  -neuro/vitals q4  -CT everette completed 9/30  -MR Brain w/wo and fiesta sequence and everette completed 9/29  -pending med clearance  -preop for OR 10/4 for trigeminal rhizotomy  -pain management following: tylenol 1g q8 standing, lyrica 150q8, valium 2q8 prn, oxy 5/10    Cardio:  -normotensive BP goal  -pending TTE  -pending cards clearance  -h/o HTN: c/w home amlodipine and lisinopril     Pulm:  -satting well on RA  -nicotine patch for h/o smoking    GI:  -regular diet  -bowel regimen    Renal:  -IVL, voiding    Endo:  -A1c 6.0    Heme:  -SCDs + SQL for DVT ppx    ID:  [id number]    MISC:  - flonase BID for nasal congestion  -nicotine patch    Dispo: regional status, full code, pending OR 10/3 or 10/4   D/w Dr. Staton    Assessment:  Present when checked    []  GCS  E   V  M     Heart Failure: []Acute, [] acute on chronic , []chronic  Heart Failure:  [] Diastolic (HFpEF), [] Systolic (HFrEF), []Combined (HFpEF and HFrEF), [] RHF, [] Pulm HTN, [] Other    [] LACI, [] ATN, [] AIN, [] other  [] CKD1, [] CKD2, [] CKD 3, [] CKD 4, [] CKD 5, []ESRD    Encephalopathy: [] Metabolic, [] Hepatic, [] toxic, [] Neurological, [] Other    Abnormal Nurtitional Status: [] malnurtition (see nutrition note), [ ]underweight: BMI < 19, [] morbid obesity: BMI >40, [] Cachexia    [] Sepsis  [] hypovolemic shock,[] cardiogenic shock, [] hemorrhagic shock, [] neuogenic shock  [] Acute Respiratory Failure  []Cerebral edema, [] Brain compression/ herniation,   [] Functional quadriplegia  [] Acute blood loss anemia

## 2023-10-01 NOTE — PROGRESS NOTE ADULT - SUBJECTIVE AND OBJECTIVE BOX
INTERVAL EVENTS: no acute events    PAST MEDICAL & SURGICAL HISTORY:  No pertinent past medical history    Hypertension    Hyperlipidemia    Trigeminal neuralgia    Lung nodule    History of aortic aneurysm    COPD, mild    History of lung surgery        MEDICATIONS  (STANDING):  acetaminophen     Tablet .. 1000 milliGRAM(s) Oral every 8 hours  amLODIPine   Tablet 10 milliGRAM(s) Oral daily  enoxaparin Injectable 40 milliGRAM(s) SubCutaneous <User Schedule>  fluticasone propionate 50 MICROgram(s)/spray Nasal Spray 1 Spray(s) Both Nostrils two times a day  influenza  Vaccine (HIGH DOSE) 0.7 milliLiter(s) IntraMuscular once  lisinopril 30 milliGRAM(s) Oral daily  nicotine - 21 mG/24Hr(s) Patch 1 Patch Transdermal daily  polyethylene glycol 3350 17 Gram(s) Oral daily  pregabalin 150 milliGRAM(s) Oral every 8 hours  senna 2 Tablet(s) Oral at bedtime    MEDICATIONS  (PRN):  diazepam    Tablet 2 milliGRAM(s) Oral every 8 hours PRN muscle spasm  oxyCODONE    IR 10 milliGRAM(s) Oral every 6 hours PRN Severe Pain (7 - 10)  oxyCODONE    IR 5 milliGRAM(s) Oral every 6 hours PRN Moderate Pain (4 - 6)    T(F): 97.5 (10-01-23 @ 09:00), Max: 97.9 (09-30-23 @ 20:29)  HR: 88 (10-01-23 @ 09:00) (67 - 88)  BP: 124/72 (10-01-23 @ 09:00) (111/74 - 150/82)  BP(mean): --  ABP: --  ABP(mean): --  RR: 18 (10-01-23 @ 09:00) (18 - 18)  SpO2: 94% (10-01-23 @ 09:00) (94% - 98%)    I/O Detail 24H    30 Sep 2023 07:01  -  01 Oct 2023 07:00  --------------------------------------------------------  IN:    Oral Fluid: 240 mL  Total IN: 240 mL    OUT:    Voided (mL): 500 mL  Total OUT: 500 mL    Total NET: -260 mL          PHYSICAL EXAM:  General: WDWN  HEENT: NC/AT; PERRL, anicteric sclera; MMM  Neck: supple  Cardiovascular: +S1/S2; RRR  Respiratory: CTA B/L; no W/R/R  Gastrointestinal: soft, NT/ND; +BSx4  Extremities: WWP; no edema, clubbing or cyanosis  Vascular: 2+ radial, DP/PT pulses B/L  Neurological: AAOx3; no focal deficits    LABS:  CBC 10-01-23 @ 05:30                        13.9   4.39  )-----------( 162                   41.9       Hgb trend: 13.9 <-- , 15.7 <-- , 13.8 <--   WBC trend: 4.39 <-- , 5.08 <-- , 5.24 <--       CMP 10-01-23 @ 05:30    140  |  106  |  14  ----------------------------<  94  4.5   |  25  |  0.80    Ca    9.2      10-01-23 @ 05:30  Phos  3.9     10-01  Mg     2.2     10-01    TPro  6.6  /  Alb  4.0  /  TBili  <0.2  /  DBili  x   /  AST  18  /  ALT  10  /  AlkPhos  61  09-29      Serum Cr trend: 0.80 <-- , 0.76 <-- , 0.79 <--     PT/INR - ( 29 Sep 2023 16:24 )   PT: 10.4 sec;   INR: 0.91          PTT - ( 29 Sep 2023 16:24 ):38.3 sec    Cardiac Markers           STUDIES:

## 2023-10-02 LAB
ANION GAP SERPL CALC-SCNC: 7 MMOL/L — SIGNIFICANT CHANGE UP (ref 5–17)
BUN SERPL-MCNC: 14 MG/DL — SIGNIFICANT CHANGE UP (ref 7–23)
CALCIUM SERPL-MCNC: 9.2 MG/DL — SIGNIFICANT CHANGE UP (ref 8.4–10.5)
CHLORIDE SERPL-SCNC: 107 MMOL/L — SIGNIFICANT CHANGE UP (ref 96–108)
CO2 SERPL-SCNC: 27 MMOL/L — SIGNIFICANT CHANGE UP (ref 22–31)
CREAT SERPL-MCNC: 0.83 MG/DL — SIGNIFICANT CHANGE UP (ref 0.5–1.3)
EGFR: 92 ML/MIN/1.73M2 — SIGNIFICANT CHANGE UP
GLUCOSE SERPL-MCNC: 92 MG/DL — SIGNIFICANT CHANGE UP (ref 70–99)
HCT VFR BLD CALC: 42.2 % — SIGNIFICANT CHANGE UP (ref 39–50)
HGB BLD-MCNC: 13.6 G/DL — SIGNIFICANT CHANGE UP (ref 13–17)
MAGNESIUM SERPL-MCNC: 2.2 MG/DL — SIGNIFICANT CHANGE UP (ref 1.6–2.6)
MCHC RBC-ENTMCNC: 31.8 PG — SIGNIFICANT CHANGE UP (ref 27–34)
MCHC RBC-ENTMCNC: 32.2 GM/DL — SIGNIFICANT CHANGE UP (ref 32–36)
MCV RBC AUTO: 98.6 FL — SIGNIFICANT CHANGE UP (ref 80–100)
NRBC # BLD: 0 /100 WBCS — SIGNIFICANT CHANGE UP (ref 0–0)
PHOSPHATE SERPL-MCNC: 4.2 MG/DL — SIGNIFICANT CHANGE UP (ref 2.5–4.5)
PLATELET # BLD AUTO: 182 K/UL — SIGNIFICANT CHANGE UP (ref 150–400)
POTASSIUM SERPL-MCNC: 4.4 MMOL/L — SIGNIFICANT CHANGE UP (ref 3.5–5.3)
POTASSIUM SERPL-SCNC: 4.4 MMOL/L — SIGNIFICANT CHANGE UP (ref 3.5–5.3)
RBC # BLD: 4.28 M/UL — SIGNIFICANT CHANGE UP (ref 4.2–5.8)
RBC # FLD: 12.7 % — SIGNIFICANT CHANGE UP (ref 10.3–14.5)
SODIUM SERPL-SCNC: 141 MMOL/L — SIGNIFICANT CHANGE UP (ref 135–145)
WBC # BLD: 5.35 K/UL — SIGNIFICANT CHANGE UP (ref 3.8–10.5)
WBC # FLD AUTO: 5.35 K/UL — SIGNIFICANT CHANGE UP (ref 3.8–10.5)

## 2023-10-02 PROCEDURE — 99253 IP/OBS CNSLTJ NEW/EST LOW 45: CPT

## 2023-10-02 PROCEDURE — 99252 IP/OBS CONSLTJ NEW/EST SF 35: CPT

## 2023-10-02 PROCEDURE — 93306 TTE W/DOPPLER COMPLETE: CPT | Mod: 26

## 2023-10-02 PROCEDURE — 99232 SBSQ HOSP IP/OBS MODERATE 35: CPT

## 2023-10-02 RX ORDER — SODIUM CHLORIDE 9 MG/ML
1000 INJECTION INTRAMUSCULAR; INTRAVENOUS; SUBCUTANEOUS
Refills: 0 | Status: DISCONTINUED | OUTPATIENT
Start: 2023-10-02 | End: 2023-10-02

## 2023-10-02 RX ORDER — OXYCODONE HYDROCHLORIDE 5 MG/1
5 TABLET ORAL EVERY 4 HOURS
Refills: 0 | Status: DISCONTINUED | OUTPATIENT
Start: 2023-10-02 | End: 2023-10-04

## 2023-10-02 RX ORDER — CHLORHEXIDINE GLUCONATE 213 G/1000ML
1 SOLUTION TOPICAL EVERY 12 HOURS
Refills: 0 | Status: DISCONTINUED | OUTPATIENT
Start: 2023-10-02 | End: 2023-10-02

## 2023-10-02 RX ADMIN — Medication 1000 MILLIGRAM(S): at 21:15

## 2023-10-02 RX ADMIN — Medication 1 SPRAY(S): at 19:05

## 2023-10-02 RX ADMIN — Medication 150 MILLIGRAM(S): at 21:16

## 2023-10-02 RX ADMIN — Medication 1 SPRAY(S): at 05:43

## 2023-10-02 RX ADMIN — OXYCODONE HYDROCHLORIDE 10 MILLIGRAM(S): 5 TABLET ORAL at 08:35

## 2023-10-02 RX ADMIN — OXYCODONE HYDROCHLORIDE 5 MILLIGRAM(S): 5 TABLET ORAL at 17:17

## 2023-10-02 RX ADMIN — OXYCODONE HYDROCHLORIDE 5 MILLIGRAM(S): 5 TABLET ORAL at 23:06

## 2023-10-02 RX ADMIN — POLYETHYLENE GLYCOL 3350 17 GRAM(S): 17 POWDER, FOR SOLUTION ORAL at 12:42

## 2023-10-02 RX ADMIN — Medication 1000 MILLIGRAM(S): at 05:41

## 2023-10-02 RX ADMIN — Medication 150 MILLIGRAM(S): at 13:18

## 2023-10-02 RX ADMIN — ENOXAPARIN SODIUM 40 MILLIGRAM(S): 100 INJECTION SUBCUTANEOUS at 21:17

## 2023-10-02 RX ADMIN — SENNA PLUS 2 TABLET(S): 8.6 TABLET ORAL at 21:16

## 2023-10-02 RX ADMIN — Medication 150 MILLIGRAM(S): at 05:42

## 2023-10-02 RX ADMIN — OXYCODONE HYDROCHLORIDE 5 MILLIGRAM(S): 5 TABLET ORAL at 16:17

## 2023-10-02 RX ADMIN — Medication 1000 MILLIGRAM(S): at 06:41

## 2023-10-02 RX ADMIN — Medication 1000 MILLIGRAM(S): at 13:19

## 2023-10-02 RX ADMIN — OXYCODONE HYDROCHLORIDE 10 MILLIGRAM(S): 5 TABLET ORAL at 09:35

## 2023-10-02 NOTE — CONSULT NOTE ADULT - SUBJECTIVE AND OBJECTIVE BOX
CHIEF COMPLAINT:    HPI:  72 y/o M w/ PMH of HTN, HLD (not on medication), COPD, lung nodule, aortic aneurysm, smoker (7-9 cigarettes per day x 40 years), trigeminal neuralgia s/p gamma knife to L trigeminal facial nerve w/o relief 6/2023 presents to [facility] from outpatient office for evaluation of severe L sided facial pain x1 year. Patient describes pain as located over the L cheek to jaw area, intermittently radiating to entire left face, occurs intermittent during the day but constant at night, varying from sharp and dull, rated 9/10 when severe, Episodes of pain typically last 45 minutes to 1 hour, has taken tylenol for pain without relief. Patient takes gabapentin 600mg tid at home, if pain is severe 4-5 times/day. Denies headaches, nausea, vomiting, dizziness, fever, chills, weakness, numbness, chest pain, vision changes, seizures, loss of consciousness, shortness of breath, palpitations, diarrhea, abdominal pain.  (29 Sep 2023 17:24)    Consultant HPI: Pt is able to walk over 4 blocks and climb two flights of stairs if he paces himself. If he tries to do it quickly he will get short of breath, he describes it as a "drowning" feeling with no endorsed wheezing. Denies any cp, palpitations, lightheadedness or dizziness.      PAST MEDICAL & SURGICAL HISTORY:  Hypertension      Hyperlipidemia      Trigeminal neuralgia      Lung nodule      History of aortic aneurysm      COPD, mild      History of lung surgery        [ ] Diabetes   [ ] Hypertension  [ ] Hyperlipidemia  [ ] CAD  [ ] PCI  [ ] CABG    PREVIOUS DIAGNOSTIC TESTING:    [ ] Echocardiogram:  [ ] Stress Test:  [ ] Catheterization: 	    FAMILY HISTORY:    SOCIAL HISTORY:    [ ] Non-smoker  [ ] Current Smoker  [ ] Former Smoker  [ ] Alcohol Use  [ ] Drug Use    ALLERGIES/INTOLERANCES:  Pineapple (Unknown)  No Known Drug Allergies    HOME MEDICATIONS:    INPATIENT MEDICATIONS:  amLODIPine   Tablet 10 milliGRAM(s) Oral daily  lisinopril 30 milliGRAM(s) Oral daily    enoxaparin Injectable 40 milliGRAM(s) SubCutaneous <User Schedule>    acetaminophen     Tablet .. 1000 milliGRAM(s) Oral every 8 hours  chlorhexidine 2% Cloths 1 Application(s) Topical every 12 hours  fluticasone propionate 50 MICROgram(s)/spray Nasal Spray 1 Spray(s) Both Nostrils two times a day  influenza  Vaccine (HIGH DOSE) 0.7 milliLiter(s) IntraMuscular once  oxyCODONE    IR 5 milliGRAM(s) Oral every 4 hours PRN  polyethylene glycol 3350 17 Gram(s) Oral daily  pregabalin 150 milliGRAM(s) Oral every 8 hours  senna 2 Tablet(s) Oral at bedtime  sodium chloride 0.9%. 1000 milliLiter(s) IV Continuous <Continuous>        [ ] Unable to obtain due to:    PHYSICAL EXAM:    T(C): 36.3 (10-02-23 @ 08:30), Max: 36.7 (10-01-23 @ 15:54)  HR: 83 (10-02-23 @ 08:30) (67 - 83)  BP: 109/72 (10-02-23 @ 08:30) (106/56 - 109/72)  RR: 17 (10-02-23 @ 08:30) (16 - 18)  SpO2: 94% (10-02-23 @ 08:30) (94% - 97%)  Wt(kg): --    I&O's Summary    01 Oct 2023 07:01  -  02 Oct 2023 07:00  --------------------------------------------------------  IN: 480 mL / OUT: 1100 mL / NET: -620 mL    02 Oct 2023 07:01  -  02 Oct 2023 14:26  --------------------------------------------------------  IN: 240 mL / OUT: 300 mL / NET: -60 mL    GENERAL: NAD, well-developed  CARDIOVASCULAR: RRR, normal S1 S2, no M/R/G, no JVD, neg HJR, nondisplaced PMI, no LE edema  RESPIRATORY: Lungs clear to auscultation b/l, no C/W/R  EXTREMITIES: Warm. No clubbing, cyanosis or edema. Normal range of motion.    LINES:    TELEMETRY: 	      ECG:  	  	  LABS:                        13.6   5.35  )-----------( 182      ( 02 Oct 2023 06:19 )             42.2     10-02    141  |  107  |  14  ----------------------------<  92  4.4   |  27  |  0.83    Ca    9.2      02 Oct 2023 06:19  Phos  4.2     10-02  Mg     2.2     10-02        Lipid Profile:   HgA1c:   TSH:     CARDIAC MARKERS:          proBNP:     RADIOLOGY:      ASSESSMENT/PLAN:

## 2023-10-02 NOTE — CONSULT NOTE ADULT - SUBJECTIVE AND OBJECTIVE BOX
NEUROSURGERY PAIN MANAGEMENT CONSULT NOTE    Chief Complaint: left facial pain     HPI:  72 y/o M w/ PMH of HTN, HLD (not on medication), COPD, lung nodule, aortic aneurysm, smoker (7-9 cigarettes per day x 40 years), trigeminal neuralgia s/p gamma knife to L trigeminal facial nerve w/o relief 6/2023 presents to [facility] from outpatient office for evaluation of severe L sided facial pain x1 year. Patient describes pain as located over the L cheek to jaw area, intermittently radiating to entire left face, occurs intermittent during the day but constant at night, varying from sharp and dull, rated 9/10 when severe, Episodes of pain typically last 45 minutes to 1 hour, has taken tylenol for pain without relief. Patient takes gabapentin 600mg tid at home, if pain is severe 4-5 times/day. Denies headaches, nausea, vomiting, dizziness, fever, chills, weakness, numbness, chest pain, vision changes, seizures, loss of consciousness, shortness of breath, palpitations, diarrhea, abdominal pain.  (29 Sep 2023 17:24)      PAST MEDICAL & SURGICAL HISTORY:  Hypertension      Hyperlipidemia      Trigeminal neuralgia      Lung nodule      History of aortic aneurysm      COPD, mild      History of lung surgery          FAMILY HISTORY:      SOCIAL HISTORY:  [ ] Denies Smoking, Alcohol, or Drug Use    HOME MEDICATIONS:   Please refer to initial HNP    PAIN HOME MEDICATIONS:    Allergies    Pineapple (Unknown)  No Known Drug Allergies    Intolerances        PAIN MEDICATIONS:  acetaminophen     Tablet .. 1000 milliGRAM(s) Oral every 8 hours  diazepam    Tablet 2 milliGRAM(s) Oral every 8 hours PRN  oxyCODONE    IR 10 milliGRAM(s) Oral every 6 hours PRN  oxyCODONE    IR 5 milliGRAM(s) Oral every 6 hours PRN  pregabalin 150 milliGRAM(s) Oral every 8 hours    Heme:  enoxaparin Injectable 40 milliGRAM(s) SubCutaneous <User Schedule>    Antibiotics:    Cardiovascular:  amLODIPine   Tablet 10 milliGRAM(s) Oral daily  lisinopril 30 milliGRAM(s) Oral daily    GI:  polyethylene glycol 3350 17 Gram(s) Oral daily  senna 2 Tablet(s) Oral at bedtime    Endocrine:    All Other Medications:  chlorhexidine 2% Cloths 1 Application(s) Topical every 12 hours  fluticasone propionate 50 MICROgram(s)/spray Nasal Spray 1 Spray(s) Both Nostrils two times a day  influenza  Vaccine (HIGH DOSE) 0.7 milliLiter(s) IntraMuscular once  nicotine - 21 mG/24Hr(s) Patch 1 Patch Transdermal daily  sodium chloride 0.9%. 1000 milliLiter(s) IV Continuous <Continuous>      Vital Signs Last 24 Hrs  T(C): 36.3 (02 Oct 2023 08:30), Max: 36.7 (01 Oct 2023 15:54)  T(F): 97.4 (02 Oct 2023 08:30), Max: 98.1 (01 Oct 2023 15:54)  HR: 83 (02 Oct 2023 08:30) (67 - 83)  BP: 109/72 (02 Oct 2023 08:30) (106/56 - 109/72)  BP(mean): 84 (02 Oct 2023 08:30) (84 - 84)  RR: 17 (02 Oct 2023 08:30) (16 - 18)  SpO2: 94% (02 Oct 2023 08:30) (94% - 97%)    Parameters below as of 02 Oct 2023 08:30  Patient On (Oxygen Delivery Method): room air        LABS:                        13.6   5.35  )-----------( 182      ( 02 Oct 2023 06:19 )             42.2     10-02    141  |  107  |  14  ----------------------------<  92  4.4   |  27  |  0.83    Ca    9.2      02 Oct 2023 06:19  Phos  4.2     10-02  Mg     2.2     10-02        Urinalysis Basic - ( 02 Oct 2023 06:19 )    Color: x / Appearance: x / SG: x / pH: x  Gluc: 92 mg/dL / Ketone: x  / Bili: x / Urobili: x   Blood: x / Protein: x / Nitrite: x   Leuk Esterase: x / RBC: x / WBC x   Sq Epi: x / Non Sq Epi: x / Bacteria: x        RADIOLOGY:    Drug Screen:        REVIEW OF SYSTEMS:  CONSTITUTIONAL: No fever or fatigue O/N.   EYES: No eye pain, visual disturbances  ENMT:  No difficulty hearing. No throat pain  NECK: No pain or stiffness  RESPIRATORY: No cough, wheezing; No shortness of breath  CARDIOVASCULAR: No chest pain, palpitations.   GASTROINTESTINAL: Pt reports passing gas. No bowel movements. No abdominal or epigastric pain. No nausea, vomiting. GENITOURINARY: No dysuria, frequency, or incontinence  NEUROLOGICAL: No headaches, loss of strength, numbness, or tremors. No dizzinesss or lightheadedness with pain medications.   MUSCULOSKELETAL: Incisional back pain. No joint pain or swelling; No muscle, or extremity pain    PAIN ASSESSMENT: pain well controlled today     PHYSICAL EXAM  GENERAL: Laying in bed, NAD  Neuro: CN II-XII PERRRL, EOMI  Cranial nerves grossly intact  Motor exam: MAEx4  Sensation intact to LT in UE/LE in 3 dermatomes  CHEST/LUNG: Clear to auscultation bilaterally; No rales, rhonchi, wheezing, or rubs  HEART: Regular rate and rhythm; No murmurs, rubs, or gallops  ABDOMEN: Soft, Nontender, Nondistended; Bowel sounds present  EXTREMITIES:  2+ Peripheral Pulses, No clubbing, cyanosis, or edema  SKIN: No rashes or lesions      ASSESSMENT:   72 yo L-handed M w/ PMHx of HTN, COPD, lung nodule, aortic aneurysm, trigeminal neuralgia s/p gamma knife 6/2023 to L trigeminal facial nerve w/o relief presents to the ED from outpatient office for evaluation of severe left facial pain.     PLAN:   - Pain:  Tylenol 1000mg every 8 hours  Lyrica 150mg every 8 hours   Stop Valium   Oxycodone 5mg every 4 hours as needed for severe pain    - Bowel regimen: Senna    - Nausea ppx: Zofran standing  - Functional Goals: Pt will get OOB with PT today. Pt will resume previous level of activity without impairment from surgery.   - Additional Consults: None recommended.   - Additional Labs/Imaging:  None recommended.   - Follow up, Discharge Planning: pending surgery tomorrow  - Pain Management follow up plan: will continue to follow    d/w

## 2023-10-02 NOTE — CONSULT NOTE ADULT - ATTENDING COMMENTS
Patient is a 72 yo M w/ PMH of HTN, HLD, COPD from over 55 PPYHx, lung nodule, trigeminal neuralgia s/p gamma knife to L trigeminal facial nerve w/o relief who presented to [facility] from outpatient office for evaluation of severe L sided facial pain admitted with trigeminal neuralgia with plans for trigeminal rhizotomy on 10/4. Cardiology consulted for pre operative evaluation and optimization    Review of Studies  - ECG 10/1/2023; NSR, RBBB  - Echo 10/2/2023: . Normal left and right ventricular size and systolic function. Normal atria. Mild aortic regurgitation. Mild mitral regurgitation. Moderate tricuspid regurgitation. Aortic sclerosis without significant stenosis. No evidence of pulmonary hypertension, pulmonary artery systolic   pressure is 34 mmHg.The aortic root is mildly dilated. The aortic root measures 3.80 cm at level of the sinuses of Valsalva (normal 3.1-3.7 cm for men, 2.7-3.3 cm for women).    # Pre operative Cardiovascular Assessment  - Patient with Pmhx of HTN and HLD here for planned trigeminal rhizotomy on 10/4.  - He denies history of chest pain, palpitations, dizziness etc however reports Chronic dyspnea with exertion which he attributes to his COPD that is not well followed. Patient is unable to recall the name of his PCP  - Patient reports he was told the right side of his heart might be weaker. Echo today showed mildly reduced RV function with moderate TR and normal Left side function with a mildly dilated aortic root  - Chart mentions Aortic aneurysm which patient is not aware of and coud not clarify. Would recommend obtaining further collaterals from [facility]   - EKG reviewed showing NSR and RBBB  - Clinically patient well compensated, in NAD without evidence of decompensated Heart Failure, ACS or tachyarrhythmia. There are no active CV contraindications to proceeding.   - A1c of 6.0 making patient pre diabetic.    - Patient is considered at intermediate risk for a low to intermediate risk procedure   - Please send TSH, and lipid profile with am labs  - Can hold Lisinopril 30 mg on day of procedure  - Cardiology will continue to follow with you, please call with any questions

## 2023-10-02 NOTE — PROGRESS NOTE ADULT - SUBJECTIVE AND OBJECTIVE BOX
Surgery: Left rhizotomy   Consent: Signed by patient vs HCP                     Representative Consent: [ x ] Signed by patient vs HCP                                                 [  ] N/A -> only for cerebral angiogram    Pineapple (Unknown)  No Known Drug Allergies      OVERNIGHT EVENTS: ANNY, neuro stable    T(C): 36.3 (10-02-23 @ 08:30), Max: 36.7 (10-01-23 @ 15:54)  HR: 83 (10-02-23 @ 08:30) (67 - 83)  BP: 109/72 (10-02-23 @ 08:30) (106/56 - 122/73)  RR: 17 (10-02-23 @ 08:30) (16 - 18)  SpO2: 94% (10-02-23 @ 08:30) (94% - 97%)  Wt(kg): --    EXAM:  General: patient seen laying supine in bed in NAD on RA  Neuro: AAOx3, FC, OE spontaneously, speech clear and fluent, CNII-XI grossly intact, face symmetric, no pronator drift, strength 5/5 b/l UE and LE, sensation intact to light touch throughout  HEENT: PERRL, EOMI  Neck: supple  Cardiac: RRR, S1S2  Pulmonary: chest rise symmetric  Abdomen: soft, nontender, nondistended  Ext: perfusing well  Skin: warm, dry      10-02    141  |  107  |  14  ----------------------------<  92  4.4   |  27  |  0.83    Ca    9.2      02 Oct 2023 06:19  Phos  4.2     10-02  Mg     2.2     10-02      CBC Full  -  ( 02 Oct 2023 06:19 )  WBC Count : 5.35 K/uL  RBC Count : 4.28 M/uL  Hemoglobin : 13.6 g/dL  Hematocrit : 42.2 %  Platelet Count - Automated : 182 K/uL  Mean Cell Volume : 98.6 fl  Mean Cell Hemoglobin : 31.8 pg  Mean Cell Hemoglobin Concentration : 32.2 gm/dL  Auto Neutrophil # : x  Auto Lymphocyte # : x  Auto Monocyte # : x  Auto Eosinophil # : x  Auto Basophil # : x  Auto Neutrophil % : x  Auto Lymphocyte % : x  Auto Monocyte % : x  Auto Eosinophil % : x  Auto Basophil % : x        Type & Screen (in past 72hrs):     2 Type & Screen within 72 hours if anticipate blood need in OR:  _x Y _ N     Blood ordered and on hold for OR:   [ ] No need     [ ] 1u pRBC on hold      [x ] 2u pRBC on hold    COVID swab (in past 48hrs): _ Y  x_N    EKG: see paper chart   ECHO: pending  Medical Clearances: obtained by Hospitalist  Other Clearances: cardiac clearance    Last dose of antiplatelet/anticoagulation drug: unknown    Implanted Devices (pacemaker, drug pump...etc):  []YES   [x] NO                  If yes --> EPS consulted to interrogate device: [ ] YES  [ ] NO                            If yes -->  EPS called to let them know patient going for surgery: [ ] device needs to be turned off                                                                                                                                                 [ ] magnet needs to be placed for surgery                                                                                                                                                [ ] nothing to do per EP, may proceed with bovie use in OR                                       3M nasal swab ordered?  x_Y  _N    Cranial surgery: Order written for hair to be shampooed night before surgery and morning before surgery  [x] yes   []no  Chlorhexidine Wipes ordered for Neck Down?  x_ Y  _ N  (twice a day if 1 day before surgery, daily for 3 days if 3 days prior, daily if in ICU)                 Assessment: 72 yo L-handed M w/ PMHx of HTN, COPD, lung nodule, aortic aneurysm, trigeminal neuralgia s/p gamma knife 6/2023 to L trigeminal facial nerve w/o relief presents to the ED from outpatient office for evaluation of severe left facial pain. Plan for L rhizotomy with Dr. Staton tomorrow (10/3).       Plan:  - Consent signed and in chart  - NPO after midnight  - IVF while NPO   - Coags, T&S pending     D/w Dr. Staton      Assessment:  Present when checked    []  GCS  E   V  M     Heart Failure: []Acute, [] acute on chronic , []chronic  Heart Failure:  [] Diastolic (HFpEF), [] Systolic (HFrEF), []Combined (HFpEF and HFrEF), [] RHF, [] Pulm HTN, [] Other    [] LACI, [] ATN, [] AIN, [] other  [] CKD1, [] CKD2, [] CKD 3, [] CKD 4, [] CKD 5, []ESRD    Encephalopathy: [] Metabolic, [] Hepatic, [] toxic, [] Neurological, [] Other    Abnormal Nurtitional Status: [] malnurtition (see nutrition note), [ ]underweight: BMI < 19, [] morbid obesity: BMI >40, [] Cachexia    [] Sepsis  [] hypovolemic shock,[] cardiogenic shock, [] hemorrhagic shock, [] neuogenic shock  [] Acute Respiratory Failure  []Cerebral edema, [] Brain compression/ herniation,   [] Functional quadriplegia  [] Acute blood loss anemia

## 2023-10-02 NOTE — CONSULT NOTE ADULT - ASSESSMENT
72 y/o M w/ PMH of HTN, HLD (not on medication), COPD, lung nodule, aortic aneurysm, smoker (7-9 cigarettes per day x 40 years), trigeminal neuralgia s/p gamma knife to L trigeminal facial nerve w/o relief 6/2023 presents to [facility] from outpatient office for evaluation of severe L sided facial pain x1 year. He is being admitted with trigeminal neuralgia with plans for trigeminal rhizotomy on 10/4. Medicine consulted for pre operative evaluation as well as comanagement.     #Pre operative evaluation  Plan for trigeminal rhizotomy on 10/4  EKG pending  RCRI 0   METS >4   - Patient is intermediate risk for an intermediate risk procedure with good functional status. No further medical work up required prior to OR, however would obtain collateral from [facility] with regards to recent diagnosis of aortic aneursym prior to OR. Very low suspicion that patient has a significant aortic aneurysm, however would be prudent to have that information prior to the OR     #COPD  Patient states that he is on flonase for COPD - unclear if any other medications  - Med rec per primary team, and continue COPD inhalers     #HTN   - Continue amlodipine 10mg QD and lisinopril 30mg QD; would hold lisinopril the morning of surgery     #HLD   - Obtain lipid profile     Medicine to continue to follow 
Patient is a 72 yo M w/ PMH of HTN, HLD, COPD from over 55 PPYHx, lung nodule, trigeminal neuralgia s/p gamma knife to L trigeminal facial nerve w/o relief who presented to [facility] from outpatient office for evaluation of severe L sided facial pain admitted with trigeminal neuralgia with plans for trigeminal rhizotomy on 10/4. Cardiology consulted for pre operative evaluation and optimization    Review of Studies  - ECG 10/1/2023; NSR, RBBB  - Echo 10/2/2023: . Normal left and right ventricular size and systolic function. Normal atria. Mild aortic regurgitation. Mild mitral regurgitation. Moderate tricuspid regurgitation. Aortic sclerosis without significant stenosis. No evidence of pulmonary hypertension, pulmonary artery systolic   pressure is 34 mmHg.The aortic root is mildly dilated. The aortic root measures 3.80 cm at level of the sinuses of Valsalva (normal 3.1-3.7 cm for men, 2.7-3.3 cm for women).    # Pre operative Cardiovascular Assessment  - Patient with Pmhx of HTN and HLD here for planned trigeminal rhizotomy on 10/4. He denies history of chest pain, palpitations, dizziness etc however reports Chronic dyspnea with exertion which he attributes to his COPD that is not well followed. Patient is unable to recall the name of his PCP. Patient reports he was told the right side of his heart might be weaker. Echo today showed mildly reduced RV function with moderate TR and normal Left side function with a mildly dilated aortic root  - Chart mentions Aortic aneurysm which patient is not aware of and coud not clarify. Would recommend obtaining further collaterals from [facility]   - Clinically patient well compensated, in NAD without evidence of decompensated Heart Failure, ACS or tachyarrhythmia. There are no active CV contraindications to proceeding.   - A1c of 6.0 making patient pre diabetic.    - Patient is considered at intermediate risk for a low to intermediate risk procedure   - Please send TSH, and lipid profile with am labs  - Can hold Lisinopril 30 mg on day of procedure          Plan was discussed with attending cardiologist  We'll continue to follow, thank you for the consultation      Johnny Medina ([id number])  Cardiovascular Disease Fellow

## 2023-10-02 NOTE — PROGRESS NOTE ADULT - SUBJECTIVE AND OBJECTIVE BOX
Patient is a 73y old  Male who presents with a chief complaint of trigeminal neuralgia (02 Oct 2023 12:46)        SUBJECTIVE:  Patient was seen and examined at bedside.    Overnight Events : resting in bed , no new complaints , no fever , chills       Review of systems: 12 point Review of systems negative unless otherwise documented elsewhere in note.     Diet, NPO after Midnight:      NPO Start Date: 02-Oct-2023,   NPO Start Time: 23:59 (10-02-23 @ 09:54) [Active]  Diet, Regular (09-29-23 @ 20:53) [Active]      MEDICATIONS:  MEDICATIONS  (STANDING):  acetaminophen     Tablet .. 1000 milliGRAM(s) Oral every 8 hours  amLODIPine   Tablet 10 milliGRAM(s) Oral daily  chlorhexidine 2% Cloths 1 Application(s) Topical every 12 hours  enoxaparin Injectable 40 milliGRAM(s) SubCutaneous <User Schedule>  fluticasone propionate 50 MICROgram(s)/spray Nasal Spray 1 Spray(s) Both Nostrils two times a day  influenza  Vaccine (HIGH DOSE) 0.7 milliLiter(s) IntraMuscular once  lisinopril 30 milliGRAM(s) Oral daily  nicotine - 21 mG/24Hr(s) Patch 1 Patch Transdermal daily  polyethylene glycol 3350 17 Gram(s) Oral daily  pregabalin 150 milliGRAM(s) Oral every 8 hours  senna 2 Tablet(s) Oral at bedtime  sodium chloride 0.9%. 1000 milliLiter(s) (60 mL/Hr) IV Continuous <Continuous>    MEDICATIONS  (PRN):  oxyCODONE    IR 5 milliGRAM(s) Oral every 4 hours PRN Severe Pain (7 - 10)      Allergies    Pineapple (Unknown)  No Known Drug Allergies    Intolerances        OBJECTIVE:  Vital Signs Last 24 Hrs  T(C): 36.3 (02 Oct 2023 08:30), Max: 36.7 (01 Oct 2023 15:54)  T(F): 97.4 (02 Oct 2023 08:30), Max: 98.1 (01 Oct 2023 15:54)  HR: 83 (02 Oct 2023 08:30) (67 - 83)  BP: 109/72 (02 Oct 2023 08:30) (106/56 - 109/72)  BP(mean): 84 (02 Oct 2023 08:30) (84 - 84)  RR: 17 (02 Oct 2023 08:30) (16 - 18)  SpO2: 94% (02 Oct 2023 08:30) (94% - 97%)    Parameters below as of 02 Oct 2023 08:30  Patient On (Oxygen Delivery Method): room air      I&O's Summary    01 Oct 2023 07:01  -  02 Oct 2023 07:00  --------------------------------------------------------  IN: 480 mL / OUT: 1100 mL / NET: -620 mL    02 Oct 2023 07:01  -  02 Oct 2023 13:17  --------------------------------------------------------  IN: 240 mL / OUT: 300 mL / NET: -60 mL        PHYSICAL EXAM:  Gen: Resting in bed at time of exam, not in distress   HEENT: moist mucosa, no lesions   Neck: supple, trachea at midline  CV: RRR, +S1/S2  Pulm: no wheezing , no crackles  no increase in work of breathing  Abd: soft, NTND  Skin: warm and dry, no new rashes   Ext: moving all 4 extremities spontaneously , no edema  ,  Neuro: AOx3, no gross focal neurological deficits  Psych: affect and behavior appropriate, pleasant at time of interview    LABS:                        13.6   5.35  )-----------( 182      ( 02 Oct 2023 06:19 )             42.2     10-02    141  |  107  |  14  ----------------------------<  92  4.4   |  27  |  0.83    Ca    9.2      02 Oct 2023 06:19  Phos  4.2     10-02  Mg     2.2     10-02          CAPILLARY BLOOD GLUCOSE        Urinalysis Basic - ( 02 Oct 2023 06:19 )    Color: x / Appearance: x / SG: x / pH: x  Gluc: 92 mg/dL / Ketone: x  / Bili: x / Urobili: x   Blood: x / Protein: x / Nitrite: x   Leuk Esterase: x / RBC: x / WBC x   Sq Epi: x / Non Sq Epi: x / Bacteria: x        MICRODATA:      RADIOLOGY/OTHER STUDIES:

## 2023-10-02 NOTE — PROGRESS NOTE ADULT - SUBJECTIVE AND OBJECTIVE BOX
HPI:  74 y/o M w/ PMH of HTN, HLD (not on medication), COPD, lung nodule, aortic aneurysm, smoker (7-9 cigarettes per day x 40 years), trigeminal neuralgia s/p gamma knife to L trigeminal facial nerve w/o relief 6/2023 presents to [facility] from outpatient office for evaluation of severe L sided facial pain x1 year. Patient describes pain as located over the L cheek to jaw area, intermittently radiating to entire left face, occurs intermittent during the day but constant at night, varying from sharp and dull, rated 9/10 when severe, Episodes of pain typically last 45 minutes to 1 hour, has taken tylenol for pain without relief. Patient takes gabapentin 600mg tid at home, if pain is severe 4-5 times/day. Denies headaches, nausea, vomiting, dizziness, fever, chills, weakness, numbness, chest pain, vision changes, seizures, loss of consciousness, shortness of breath, palpitations, diarrhea, abdominal pain.  (29 Sep 2023 17:24)    Hospital course:  9/29: presented to ER from outpatient office for evaluation of severe left facial pain. Admitted to [facility] under neurosurgery. MRI completed.   9/30: Endorsing severe pain on left side of face. ANNY. Resumed home amlodipine and lisinopril. CT everette completed. Pain management consulted, recommended starting tylenol 1gq8, lyrica 150q8, valium 2q8 prn, d/c gabapentin.   10/1: ANNY overnight. Pending TTE  10/2: ANNY overnight, had BM yesterday, pre op for rhizotomy tuesday    Vital Signs Last 24 Hrs  T(C): 36.6 (01 Oct 2023 20:07), Max: 36.7 (01 Oct 2023 15:54)  T(F): 97.8 (01 Oct 2023 20:07), Max: 98.1 (01 Oct 2023 15:54)  HR: 78 (01 Oct 2023 20:07) (67 - 88)  BP: 106/78 (01 Oct 2023 20:07) (106/78 - 124/72)  BP(mean): --  RR: 16 (01 Oct 2023 20:07) (16 - 18)  SpO2: 97% (01 Oct 2023 20:07) (94% - 97%)    Parameters below as of 01 Oct 2023 20:07  Patient On (Oxygen Delivery Method): room air        I&O's Detail    30 Sep 2023 07:01  -  01 Oct 2023 07:00  --------------------------------------------------------  IN:    Oral Fluid: 240 mL  Total IN: 240 mL    OUT:    Voided (mL): 500 mL  Total OUT: 500 mL    Total NET: -260 mL      01 Oct 2023 07:01  -  02 Oct 2023 00:28  --------------------------------------------------------  IN:    Oral Fluid: 240 mL  Total IN: 240 mL    OUT:    Voided (mL): 700 mL  Total OUT: 700 mL    Total NET: -460 mL        I&O's Summary    30 Sep 2023 07:01  -  01 Oct 2023 07:00  --------------------------------------------------------  IN: 240 mL / OUT: 500 mL / NET: -260 mL    01 Oct 2023 07:01  -  02 Oct 2023 00:28  --------------------------------------------------------  IN: 240 mL / OUT: 700 mL / NET: -460 mL        PHYSICAL EXAM:  General: patient seen laying supine in bed in NAD  Neuro: AAOx3, follows commands, opens eyes spontaneously, speech clear and fluent, CNII-XI grossly intact, face symmetric, no pronator drift, strength 5/5 b/l upper extremities and lower extremities, sensation intact to light touch throughout  HEENT: PERRL, Left pupil irregularly shaped,  EOMI  Neck: supple  Cardiac: RRR, S1S2  Pulmonary: chest rise symmetric  Abdomen: soft, nontender, nondistended  Ext: perfusing well  Skin: warm, dry    TUBES/LINES:  [] CVC  [] A-line  [] Lumbar Drain  [] Ventriculostomy  [] Other    DIET:  [] NPO  [] Mechanical  [] Tube feeds    LABS:                        13.9   4.39  )-----------( 162      ( 01 Oct 2023 05:30 )             41.9     10-01    140  |  106  |  14  ----------------------------<  94  4.5   |  25  |  0.80    Ca    9.2      01 Oct 2023 05:30  Phos  3.9     10-01  Mg     2.2     10-01        Urinalysis Basic - ( 01 Oct 2023 05:30 )    Color: x / Appearance: x / SG: x / pH: x  Gluc: 94 mg/dL / Ketone: x  / Bili: x / Urobili: x   Blood: x / Protein: x / Nitrite: x   Leuk Esterase: x / RBC: x / WBC x   Sq Epi: x / Non Sq Epi: x / Bacteria: x          CAPILLARY BLOOD GLUCOSE          Drug Levels: [] N/A    CSF Analysis: [] N/A      Allergies    Pineapple (Unknown)  No Known Drug Allergies    Intolerances      MEDICATIONS:  Antibiotics:    Neuro:  acetaminophen     Tablet .. 1000 milliGRAM(s) Oral every 8 hours  diazepam    Tablet 2 milliGRAM(s) Oral every 8 hours PRN  oxyCODONE    IR 10 milliGRAM(s) Oral every 6 hours PRN  oxyCODONE    IR 5 milliGRAM(s) Oral every 6 hours PRN  pregabalin 150 milliGRAM(s) Oral every 8 hours    Anticoagulation:  enoxaparin Injectable 40 milliGRAM(s) SubCutaneous <User Schedule>    OTHER:  amLODIPine   Tablet 10 milliGRAM(s) Oral daily  fluticasone propionate 50 MICROgram(s)/spray Nasal Spray 1 Spray(s) Both Nostrils two times a day  influenza  Vaccine (HIGH DOSE) 0.7 milliLiter(s) IntraMuscular once  lisinopril 30 milliGRAM(s) Oral daily  nicotine - 21 mG/24Hr(s) Patch 1 Patch Transdermal daily  polyethylene glycol 3350 17 Gram(s) Oral daily  senna 2 Tablet(s) Oral at bedtime    IVF:    CULTURES:    RADIOLOGY & ADDITIONAL TESTS:      ASSESSMENT:  72 yo L-handed M w/ PMHx of HTN, COPD, lung nodule, aortic aneurysm, trigeminal neuralgia s/p gamma knife 6/2023 to L trigeminal facial nerve w/o relief presents to the ED from outpatient office for evaluation of severe left facial pain.     TRIGEMINAL NEURALGIA    Handoff    MEWS Score    No pertinent past medical history    Hypertension    Hyperlipidemia    Trigeminal neuralgia    Lung nodule    History of aortic aneurysm    COPD, mild    Trigeminal neuralgia    Trigeminal neuralgia    History of lung surgery    FACIAL PAIN    90+    [email or web address]        PLAN:  Neuro:  -neuro/vitals q4  -CT everette completed 9/30  -MR Brain w/wo and fiesta sequence and everette completed 9/29  -pending med clearance  -preop for OR 10/4 for trigeminal rhizotomy  -pain management following: tylenol 1g q8 standing, lyrica 150q8, valium 2q8 prn, oxy 5/10    Cardio:  -normotensive BP goal  -pending TTE  -pending cards clearance  -h/o HTN: c/w home amlodipine and lisinopril     Pulm:  -satting well on RA  -nicotine patch for h/o smoking    GI:  -regular diet  -bowel regimen  - BM 10/1    Renal:  -IVL, voiding    Endo:  -A1c 6.0    Heme:  -SCDs + SQL for DVT ppx    ID:  [email or web address]    MISC:  - flonase BID for nasal congestion  -nicotine patch    Dispo: regional status, full code, pending OR 10/3 or 10/4     D/w Dr. Staton    Assessment:  Present when checked    []  GCS  E   V  M     Heart Failure: []Acute, [] acute on chronic , []chronic  Heart Failure:  [] Diastolic (HFpEF), [] Systolic (HFrEF), []Combined (HFpEF and HFrEF), [] RHF, [] Pulm HTN, [] Other    [] LACI, [] ATN, [] AIN, [] other  [] CKD1, [] CKD2, [] CKD 3, [] CKD 4, [] CKD 5, []ESRD    Encephalopathy: [] Metabolic, [] Hepatic, [] toxic, [] Neurological, [] Other    Abnormal Nurtitional Status: [] malnurtition (see nutrition note), [ ]underweight: BMI < 19, [] morbid obesity: BMI >40, [] Cachexia    [] Sepsis  [] hypovolemic shock,[] cardiogenic shock, [] hemorrhagic shock, [] neuogenic shock  [] Acute Respiratory Failure  []Cerebral edema, [] Brain compression/ herniation,   [] Functional quadriplegia  [] Acute blood loss anemia

## 2023-10-03 ENCOUNTER — TRANSCRIPTION ENCOUNTER (OUTPATIENT)
Age: 73
End: 2023-10-03

## 2023-10-03 PROBLEM — R91.1 SOLITARY PULMONARY NODULE: Chronic | Status: ACTIVE | Noted: 2023-09-29

## 2023-10-03 PROBLEM — E78.5 HYPERLIPIDEMIA, UNSPECIFIED: Chronic | Status: ACTIVE | Noted: 2023-09-29

## 2023-10-03 PROBLEM — I10 ESSENTIAL (PRIMARY) HYPERTENSION: Chronic | Status: ACTIVE | Noted: 2023-09-29

## 2023-10-03 PROBLEM — J44.9 CHRONIC OBSTRUCTIVE PULMONARY DISEASE, UNSPECIFIED: Chronic | Status: ACTIVE | Noted: 2023-09-29

## 2023-10-03 PROBLEM — Z86.79 PERSONAL HISTORY OF OTHER DISEASES OF THE CIRCULATORY SYSTEM: Chronic | Status: ACTIVE | Noted: 2023-09-29

## 2023-10-03 PROBLEM — G50.0 TRIGEMINAL NEURALGIA: Chronic | Status: ACTIVE | Noted: 2023-09-29

## 2023-10-03 LAB
ANION GAP SERPL CALC-SCNC: 9 MMOL/L — SIGNIFICANT CHANGE UP (ref 5–17)
APTT BLD: 34.2 SEC — SIGNIFICANT CHANGE UP (ref 24.5–35.6)
BLD GP AB SCN SERPL QL: NEGATIVE — SIGNIFICANT CHANGE UP
BUN SERPL-MCNC: 13 MG/DL — SIGNIFICANT CHANGE UP (ref 7–23)
CALCIUM SERPL-MCNC: 9.5 MG/DL — SIGNIFICANT CHANGE UP (ref 8.4–10.5)
CHLORIDE SERPL-SCNC: 105 MMOL/L — SIGNIFICANT CHANGE UP (ref 96–108)
CO2 SERPL-SCNC: 25 MMOL/L — SIGNIFICANT CHANGE UP (ref 22–31)
CREAT SERPL-MCNC: 0.82 MG/DL — SIGNIFICANT CHANGE UP (ref 0.5–1.3)
EGFR: 93 ML/MIN/1.73M2 — SIGNIFICANT CHANGE UP
GLUCOSE SERPL-MCNC: 95 MG/DL — SIGNIFICANT CHANGE UP (ref 70–99)
HCT VFR BLD CALC: 44 % — SIGNIFICANT CHANGE UP (ref 39–50)
HGB BLD-MCNC: 14.5 G/DL — SIGNIFICANT CHANGE UP (ref 13–17)
INR BLD: 0.94 — SIGNIFICANT CHANGE UP (ref 0.85–1.18)
MAGNESIUM SERPL-MCNC: 2.3 MG/DL — SIGNIFICANT CHANGE UP (ref 1.6–2.6)
MCHC RBC-ENTMCNC: 31.8 PG — SIGNIFICANT CHANGE UP (ref 27–34)
MCHC RBC-ENTMCNC: 33 GM/DL — SIGNIFICANT CHANGE UP (ref 32–36)
MCV RBC AUTO: 96.5 FL — SIGNIFICANT CHANGE UP (ref 80–100)
NRBC # BLD: 0 /100 WBCS — SIGNIFICANT CHANGE UP (ref 0–0)
PHOSPHATE SERPL-MCNC: 4 MG/DL — SIGNIFICANT CHANGE UP (ref 2.5–4.5)
PLATELET # BLD AUTO: 200 K/UL — SIGNIFICANT CHANGE UP (ref 150–400)
POTASSIUM SERPL-MCNC: 4.6 MMOL/L — SIGNIFICANT CHANGE UP (ref 3.5–5.3)
POTASSIUM SERPL-SCNC: 4.6 MMOL/L — SIGNIFICANT CHANGE UP (ref 3.5–5.3)
PROTHROM AB SERPL-ACNC: 10.7 SEC — SIGNIFICANT CHANGE UP (ref 9.5–13)
RBC # BLD: 4.56 M/UL — SIGNIFICANT CHANGE UP (ref 4.2–5.8)
RBC # FLD: 12.6 % — SIGNIFICANT CHANGE UP (ref 10.3–14.5)
RH IG SCN BLD-IMP: POSITIVE — SIGNIFICANT CHANGE UP
SODIUM SERPL-SCNC: 139 MMOL/L — SIGNIFICANT CHANGE UP (ref 135–145)
WBC # BLD: 5.72 K/UL — SIGNIFICANT CHANGE UP (ref 3.8–10.5)
WBC # FLD AUTO: 5.72 K/UL — SIGNIFICANT CHANGE UP (ref 3.8–10.5)

## 2023-10-03 PROCEDURE — 99232 SBSQ HOSP IP/OBS MODERATE 35: CPT

## 2023-10-03 RX ORDER — SODIUM CHLORIDE 9 MG/ML
1000 INJECTION INTRAMUSCULAR; INTRAVENOUS; SUBCUTANEOUS
Refills: 0 | Status: DISCONTINUED | OUTPATIENT
Start: 2023-10-03 | End: 2023-10-04

## 2023-10-03 RX ORDER — LISINOPRIL 2.5 MG/1
30 TABLET ORAL ONCE
Refills: 0 | Status: COMPLETED | OUTPATIENT
Start: 2023-10-03 | End: 2023-10-03

## 2023-10-03 RX ADMIN — AMLODIPINE BESYLATE 10 MILLIGRAM(S): 2.5 TABLET ORAL at 05:28

## 2023-10-03 RX ADMIN — OXYCODONE HYDROCHLORIDE 5 MILLIGRAM(S): 5 TABLET ORAL at 04:01

## 2023-10-03 RX ADMIN — Medication 1 SPRAY(S): at 05:30

## 2023-10-03 RX ADMIN — Medication 150 MILLIGRAM(S): at 05:28

## 2023-10-03 RX ADMIN — POLYETHYLENE GLYCOL 3350 17 GRAM(S): 17 POWDER, FOR SOLUTION ORAL at 13:05

## 2023-10-03 RX ADMIN — Medication 200 MILLIGRAM(S): at 21:21

## 2023-10-03 RX ADMIN — OXYCODONE HYDROCHLORIDE 5 MILLIGRAM(S): 5 TABLET ORAL at 10:11

## 2023-10-03 RX ADMIN — OXYCODONE HYDROCHLORIDE 5 MILLIGRAM(S): 5 TABLET ORAL at 18:21

## 2023-10-03 RX ADMIN — OXYCODONE HYDROCHLORIDE 5 MILLIGRAM(S): 5 TABLET ORAL at 03:01

## 2023-10-03 RX ADMIN — LISINOPRIL 30 MILLIGRAM(S): 2.5 TABLET ORAL at 05:29

## 2023-10-03 RX ADMIN — OXYCODONE HYDROCHLORIDE 5 MILLIGRAM(S): 5 TABLET ORAL at 19:21

## 2023-10-03 RX ADMIN — Medication 1000 MILLIGRAM(S): at 05:28

## 2023-10-03 RX ADMIN — Medication 1000 MILLIGRAM(S): at 21:20

## 2023-10-03 RX ADMIN — Medication 200 MILLIGRAM(S): at 13:15

## 2023-10-03 RX ADMIN — Medication 1000 MILLIGRAM(S): at 22:20

## 2023-10-03 RX ADMIN — OXYCODONE HYDROCHLORIDE 5 MILLIGRAM(S): 5 TABLET ORAL at 00:06

## 2023-10-03 RX ADMIN — Medication 1000 MILLIGRAM(S): at 13:15

## 2023-10-03 RX ADMIN — SENNA PLUS 2 TABLET(S): 8.6 TABLET ORAL at 21:21

## 2023-10-03 RX ADMIN — Medication 1 SPRAY(S): at 18:21

## 2023-10-03 RX ADMIN — OXYCODONE HYDROCHLORIDE 5 MILLIGRAM(S): 5 TABLET ORAL at 09:11

## 2023-10-03 NOTE — PROGRESS NOTE ADULT - ATTENDING COMMENTS
Patient is a 74 yo M w/ PMH of HTN, HLD, COPD from over 55 PPYHx, lung nodule, trigeminal neuralgia s/p gamma knife to L trigeminal facial nerve w/o relief who presented to [facility] from outpatient office for evaluation of severe L sided facial pain admitted with trigeminal neuralgia with plans for trigeminal rhizotomy on 10/4. Cardiology consulted for pre operative evaluation and optimization    Review of Studies  - ECG 10/1/2023; NSR, RBBB  - Echo 10/2/2023: . Normal left and right ventricular size and systolic function. Normal atria. Mild aortic regurgitation. Mild mitral regurgitation. Moderate tricuspid regurgitation. Aortic sclerosis without significant stenosis. No evidence of pulmonary hypertension, pulmonary artery systolic   pressure is 34 mmHg.The aortic root is mildly dilated. The aortic root measures 3.80 cm at level of the sinuses of Valsalva (normal 3.1-3.7 cm for men, 2.7-3.3 cm for women).    # Pre operative Cardiovascular Assessment  - Patient with Pmhx of HTN and HLD here for planned trigeminal rhizotomy on 10/4.  - He denies history of chest pain, palpitations, dizziness etc however reports Chronic dyspnea with exertion which he attributes to his COPD that is not well followed. Patient is unable to recall the name of his PCP  - Patient reports he was told the right side of his heart might be weaker. Echo today showed mildly reduced RV function with moderate TR and normal Left side function with a mildly dilated aortic root  - Chart mentions Aortic aneurysm which patient is not aware of and coud not clarify. Would recommend obtaining further collaterals from [facility]   - EKG reviewed showing NSR and RBBB  - Clinically patient well compensated, in NAD without evidence of decompensated Heart Failure, ACS or tachyarrhythmia. There are no active CV contraindications to proceeding.   - A1c of 6.0 making patient pre diabetic.    - Patient is considered at intermediate risk for a low to intermediate risk procedure   - Please send TSH, and lipid profile with am labs  - Can hold Lisinopril 30 mg on day of procedure  - Cardiology will continue to follow with you, please call with any questions.

## 2023-10-03 NOTE — PROGRESS NOTE ADULT - ASSESSMENT
74 y/o M w/ PMH of HTN, HLD (not on medication), COPD, lung nodule, aortic aneurysm, smoker (7-9 cigarettes per day x 40 years), trigeminal neuralgia s/p gamma knife to L trigeminal facial nerve w/o relief 6/2023 presents to [facility] from outpatient office for evaluation of severe L sided facial pain x1 year. He is being admitted with trigeminal neuralgia with plans for trigeminal rhizotomy on 10/4. Medicine consulted for pre operative evaluation as well as comanagement.     # Pre operative evaluation  -Plan for trigeminal rhizotomy on 10/4  - TTE  -  1. Normal left and right ventricular size and systolic function.   2. Normal atria.   3. Mild aortic regurgitation.   4. Mild mitral regurgitation.   5. Moderate tricuspid regurgitation.   6. Aortic sclerosis without significant stenosis.  - RCRI 0   - METS >4   - Patient is intermediate risk for an intermediate risk procedure with good functional status. No further medical work up required prior to OR,    # COPD without exacerbation   - Patient states that he is on flonase for COPD - unclear if any other medications  - Med rec per primary team, and continue COPD inhalers     # HTN  - Continue amlodipine 10mg QD and lisinopril 30mg QD; would hold lisinopril the morning of surgery     # HLD      Medicine to continue to follow 
72 y/o M w/ PMH of HTN, HLD (not on medication), COPD, lung nodule, aortic aneurysm, smoker (7-9 cigarettes per day x 40 years), trigeminal neuralgia s/p gamma knife to L trigeminal facial nerve w/o relief 6/2023 presents to [facility] from outpatient office for evaluation of severe L sided facial pain x1 year. He is being admitted with trigeminal neuralgia with plans for trigeminal rhizotomy on 10/4. Medicine consulted for pre operative evaluation as well as comanagement.     #Pre operative evaluation  Plan for trigeminal rhizotomy on 10/4  EKG pending  RCRI 0   METS >4   - Patient is intermediate risk for an intermediate risk procedure with good functional status. No further medical work up required prior to OR, however would obtain collateral from [facility] with regards to recent diagnosis of aortic aneursym prior to OR. Very low suspicion that patient has a significant aortic aneurysm, however would be prudent to have that information prior to the OR     #COPD  Patient states that he is on flonase for COPD - unclear if any other medications  - Med rec per primary team, and continue COPD inhalers     #HTN   - Continue amlodipine 10mg QD and lisinopril 30mg QD; would hold lisinopril the morning of surgery     #HLD   - Obtain lipid profile     Medicine to continue to follow   
I have personally reviewed the above clinical note and all of its components and:  [ ] agree with the above assessment and plan without modifications.  [x ] agree with the above with modifications made to the assessment and/or plan of care.  [ ] disagree with the above with significant modifications as listed below:
Patient is a 74 yo M w/ PMH of HTN, HLD, COPD from over 55 PPYHx, lung nodule, trigeminal neuralgia s/p gamma knife to L trigeminal facial nerve w/o relief who presented to [facility] from outpatient office for evaluation of severe L sided facial pain admitted with trigeminal neuralgia with plans for trigeminal rhizotomy on 10/4. Cardiology consulted for pre operative evaluation and optimization    Review of Studies  - ECG 10/1/2023; NSR, RBBB  - Echo 10/2/2023: . Normal left and right ventricular size and systolic function. Normal atria. Mild aortic regurgitation. Mild mitral regurgitation. Moderate tricuspid regurgitation. Aortic sclerosis without significant stenosis. No evidence of pulmonary hypertension, pulmonary artery systolic   pressure is 34 mmHg.The aortic root is mildly dilated. The aortic root measures 3.80 cm at level of the sinuses of Valsalva (normal 3.1-3.7 cm for men, 2.7-3.3 cm for women).    # Pre operative Cardiovascular Assessment  - Patient with Pmhx of HTN and HLD here for planned trigeminal rhizotomy on 10/4. He denies history of chest pain, palpitations, dizziness etc however reports Chronic dyspnea with exertion which he attributes to his COPD that is not well followed. Patient is unable to recall the name of his PCP. Patient reports he was told the right side of his heart might be weaker. Echo today showed mildly reduced RV function with moderate TR and normal Left side function with a mildly dilated aortic root  - Chart mentions Aortic aneurysm which patient is not aware of and coud not clarify. Would recommend obtaining further collaterals from [facility]   - Clinically patient well compensated, in NAD without evidence of decompensated Heart Failure, ACS or tachyarrhythmia. There are no active CV contraindications to proceeding.   - A1c of 6.0 making patient pre diabetic.    - Patient is considered at intermediate risk for a low to intermediate risk procedure   - Please send TSH, and lipid profile with am labs  - Can hold Lisinopril 30 mg on day of procedure          Plan was discussed with attending cardiologist  We'll continue to follow, thank you for the consultation      Johnny Medina ([id number])  Cardiovascular Disease Fellow

## 2023-10-03 NOTE — PROGRESS NOTE ADULT - SUBJECTIVE AND OBJECTIVE BOX
HPI:  72 y/o M w/ PMH of HTN, HLD (not on medication), COPD, lung nodule, aortic aneurysm, smoker (7-9 cigarettes per day x 40 years), trigeminal neuralgia s/p gamma knife to L trigeminal facial nerve w/o relief 6/2023 presents to [facility] from outpatient office for evaluation of severe L sided facial pain x1 year. Patient describes pain as located over the L cheek to jaw area, intermittently radiating to entire left face, occurs intermittent during the day but constant at night, varying from sharp and dull, rated 9/10 when severe, Episodes of pain typically last 45 minutes to 1 hour, has taken tylenol for pain without relief. Patient takes gabapentin 600mg tid at home, if pain is severe 4-5 times/day. Denies headaches, nausea, vomiting, dizziness, fever, chills, weakness, numbness, chest pain, vision changes, seizures, loss of consciousness, shortness of breath, palpitations, diarrhea, abdominal pain.  (29 Sep 2023 17:24)    Hospital course:  9/29: presented to ER from outpatient office for evaluation of severe left facial pain. Admitted to [facility] under neurosurgery. MRI completed.   9/30: Endorsing severe pain on left side of face. ANNY. Resumed home amlodipine and lisinopril. CT everette completed. Pain management consulted, recommended starting tylenol 1gq8, lyrica 150q8, valium 2q8 prn, d/c gabapentin.   10/1: ANNY overnight. Pending TTE  10/2: ANNY overnight, had BM yesterday, pre op for rhizotomy tuesday  10/3: ANNY overnight, exam stable, OR tomorrow    Vital Signs Last 24 Hrs  T(C): 36.5 (02 Oct 2023 20:25), Max: 36.8 (02 Oct 2023 16:05)  T(F): 97.7 (02 Oct 2023 20:25), Max: 98.2 (02 Oct 2023 16:05)  HR: 71 (02 Oct 2023 20:25) (67 - 83)  BP: 127/67 (02 Oct 2023 20:25) (106/56 - 127/67)  BP(mean): 84 (02 Oct 2023 08:30) (84 - 84)  RR: 17 (02 Oct 2023 20:25) (16 - 17)  SpO2: 95% (02 Oct 2023 20:25) (94% - 98%)    Parameters below as of 02 Oct 2023 20:25  Patient On (Oxygen Delivery Method): room air        I&O's Detail    01 Oct 2023 07:01  -  02 Oct 2023 07:00  --------------------------------------------------------  IN:    Oral Fluid: 480 mL  Total IN: 480 mL    OUT:    Voided (mL): 1100 mL  Total OUT: 1100 mL    Total NET: -620 mL      02 Oct 2023 07:01  -  03 Oct 2023 00:18  --------------------------------------------------------  IN:    Oral Fluid: 600 mL  Total IN: 600 mL    OUT:    Voided (mL): 800 mL  Total OUT: 800 mL    Total NET: -200 mL        I&O's Summary    01 Oct 2023 07:01  -  02 Oct 2023 07:00  --------------------------------------------------------  IN: 480 mL / OUT: 1100 mL / NET: -620 mL    02 Oct 2023 07:01  -  03 Oct 2023 00:18  --------------------------------------------------------  IN: 600 mL / OUT: 800 mL / NET: -200 mL        PHYSICAL EXAM:  General: patient seen laying supine in bed in NAD  Neuro: AAOx3, follows commands, opens eyes spontaneously, speech clear and fluent, CNII-XI grossly intact, face symmetric, no pronator drift, strength 5/5 b/l upper extremities and lower extremities, sensation intact to light touch throughout  HEENT: PERRL, Left pupil irregularly shaped,  EOMI  Neck: supple  Cardiac: RRR, S1S2  Pulmonary: chest rise symmetric  Abdomen: soft, nontender, nondistended  Ext: perfusing well  Skin: warm, dry    TUBES/LINES:  [] CVC  [] A-line  [] Lumbar Drain  [] Ventriculostomy  [] Other    DIET:  [] NPO  [] Mechanical  [] Tube feeds    LABS:                        13.6   5.35  )-----------( 182      ( 02 Oct 2023 06:19 )             42.2     10-02    141  |  107  |  14  ----------------------------<  92  4.4   |  27  |  0.83    Ca    9.2      02 Oct 2023 06:19  Phos  4.2     10-02  Mg     2.2     10-02        Urinalysis Basic - ( 02 Oct 2023 06:19 )    Color: x / Appearance: x / SG: x / pH: x  Gluc: 92 mg/dL / Ketone: x  / Bili: x / Urobili: x   Blood: x / Protein: x / Nitrite: x   Leuk Esterase: x / RBC: x / WBC x   Sq Epi: x / Non Sq Epi: x / Bacteria: x          CAPILLARY BLOOD GLUCOSE          Drug Levels: [] N/A    CSF Analysis: [] N/A      Allergies    Pineapple (Unknown)  No Known Drug Allergies    Intolerances      MEDICATIONS:  Antibiotics:    Neuro:  acetaminophen     Tablet .. 1000 milliGRAM(s) Oral every 8 hours  oxyCODONE    IR 5 milliGRAM(s) Oral every 4 hours PRN  pregabalin 150 milliGRAM(s) Oral every 8 hours    Anticoagulation:  enoxaparin Injectable 40 milliGRAM(s) SubCutaneous <User Schedule>    OTHER:  amLODIPine   Tablet 10 milliGRAM(s) Oral daily  fluticasone propionate 50 MICROgram(s)/spray Nasal Spray 1 Spray(s) Both Nostrils two times a day  influenza  Vaccine (HIGH DOSE) 0.7 milliLiter(s) IntraMuscular once  lisinopril 30 milliGRAM(s) Oral daily  nicotine - 21 mG/24Hr(s) Patch 1 Patch Transdermal daily  polyethylene glycol 3350 17 Gram(s) Oral daily  senna 2 Tablet(s) Oral at bedtime    IVF:    CULTURES:    RADIOLOGY & ADDITIONAL TESTS:      ASSESSMENT:  72 yo L-handed M w/ PMHx of HTN, COPD, lung nodule, aortic aneurysm, trigeminal neuralgia s/p gamma knife 6/2023 to L trigeminal facial nerve w/o relief presents to the ED from outpatient office for evaluation of severe left facial pain.     TRIGEMINAL NEURALGIA    Handoff    MEWS Score    No pertinent past medical history    Hypertension    Hyperlipidemia    Trigeminal neuralgia    Lung nodule    History of aortic aneurysm    COPD, mild    Trigeminal neuralgia    Trigeminal neuralgia    History of lung surgery    FACIAL PAIN    90+    [email or web address]        PLAN:  Neuro:  -neuro/vitals q4  -CT everette completed 9/30  -MR Brain w/wo and fiesta sequence and everette completed 9/29  -pending med clearance  -preop for OR 10/4 for trigeminal rhizotomy  -pain management following: tylenol 1g q8 standing, lyrica 150q8, valium 2q8 prn, oxy 5/10    Cardio:  -normotensive BP goal  -cardiology clearance obtained   -h/o HTN: c/w home amlodipine and lisinopril   -TTE 10/2: mild aortic/mitral regurg, mild aortic root dilation, aortic sclerosis w/o sig stenosis, EF 56%    Pulm:  -satting well on RA  -nicotine patch for h/o smoking    GI:  -regular diet  -bowel regimen  - BM 10/1    Renal:  -IVL, voiding    Endo:  -A1c 6.0    Heme:  -SCDs + SQL for DVT ppx    ID:  [id number]    MISC:  - flonase BID for nasal congestion  -nicotine patch    Dispo: regional status, full code, pending OR 10/3 or 10/4     D/w Dr. Staton      Assessment:  Present when checked    []  GCS  E   V  M     Heart Failure: []Acute, [] acute on chronic , []chronic  Heart Failure:  [] Diastolic (HFpEF), [] Systolic (HFrEF), []Combined (HFpEF and HFrEF), [] RHF, [] Pulm HTN, [] Other    [] LACI, [] ATN, [] AIN, [] other  [] CKD1, [] CKD2, [] CKD 3, [] CKD 4, [] CKD 5, []ESRD    Encephalopathy: [] Metabolic, [] Hepatic, [] toxic, [] Neurological, [] Other    Abnormal Nurtitional Status: [] malnurtition (see nutrition note), [ ]underweight: BMI < 19, [] morbid obesity: BMI >40, [] Cachexia    [] Sepsis  [] hypovolemic shock,[] cardiogenic shock, [] hemorrhagic shock, [] neuogenic shock  [] Acute Respiratory Failure  []Cerebral edema, [] Brain compression/ herniation,   [] Functional quadriplegia  [] Acute blood loss anemia

## 2023-10-03 NOTE — PROGRESS NOTE ADULT - SUBJECTIVE AND OBJECTIVE BOX
NEUROSURGERY PAIN MANAGEMENT CONSULT NOTE    Chief Complaint: left facial pain     HPI:  72 y/o M w/ PMH of HTN, HLD (not on medication), COPD, lung nodule, aortic aneurysm, smoker (7-9 cigarettes per day x 40 years), trigeminal neuralgia s/p gamma knife to L trigeminal facial nerve w/o relief 6/2023 presents to [facility] from outpatient office for evaluation of severe L sided facial pain x1 year. Patient describes pain as located over the L cheek to jaw area, intermittently radiating to entire left face, occurs intermittent during the day but constant at night, varying from sharp and dull, rated 9/10 when severe, Episodes of pain typically last 45 minutes to 1 hour, has taken tylenol for pain without relief. Patient takes gabapentin 600mg tid at home, if pain is severe 4-5 times/day. Denies headaches, nausea, vomiting, dizziness, fever, chills, weakness, numbness, chest pain, vision changes, seizures, loss of consciousness, shortness of breath, palpitations, diarrhea, abdominal pain.  (29 Sep 2023 17:24)      PAST MEDICAL & SURGICAL HISTORY:  Hypertension      Hyperlipidemia      Trigeminal neuralgia      Lung nodule      History of aortic aneurysm      COPD, mild      History of lung surgery          FAMILY HISTORY:      SOCIAL HISTORY:  [ ] Denies Smoking, Alcohol, or Drug Use    HOME MEDICATIONS:   Please refer to initial HNP    PAIN HOME MEDICATIONS:    Allergies    Pineapple (Unknown)  No Known Drug Allergies    Intolerances        PAIN MEDICATIONS:  acetaminophen     Tablet .. 1000 milliGRAM(s) Oral every 8 hours  oxyCODONE    IR 5 milliGRAM(s) Oral every 4 hours PRN  pregabalin 200 milliGRAM(s) Oral every 8 hours    Heme:    Antibiotics:    Cardiovascular:  amLODIPine   Tablet 10 milliGRAM(s) Oral daily    GI:  polyethylene glycol 3350 17 Gram(s) Oral daily  senna 2 Tablet(s) Oral at bedtime    Endocrine:    All Other Medications:  fluticasone propionate 50 MICROgram(s)/spray Nasal Spray 1 Spray(s) Both Nostrils two times a day  influenza  Vaccine (HIGH DOSE) 0.7 milliLiter(s) IntraMuscular once  nicotine - 21 mG/24Hr(s) Patch 1 Patch Transdermal daily  sodium chloride 0.9%. 1000 milliLiter(s) IV Continuous <Continuous>      Vital Signs Last 24 Hrs  T(C): 36.6 (03 Oct 2023 09:06), Max: 36.8 (02 Oct 2023 16:05)  T(F): 97.8 (03 Oct 2023 09:06), Max: 98.2 (02 Oct 2023 16:05)  HR: 88 (03 Oct 2023 09:06) (71 - 88)  BP: 111/72 (03 Oct 2023 09:06) (102/65 - 127/67)  BP(mean): 85 (03 Oct 2023 09:06) (85 - 85)  RR: 17 (03 Oct 2023 09:06) (16 - 17)  SpO2: 95% (03 Oct 2023 09:06) (94% - 98%)    Parameters below as of 03 Oct 2023 09:06  Patient On (Oxygen Delivery Method): room air        LABS:                        14.5   5.72  )-----------( 200      ( 03 Oct 2023 05:30 )             44.0     10-03    139  |  105  |  13  ----------------------------<  95  4.6   |  25  |  0.82    Ca    9.5      03 Oct 2023 05:30  Phos  4.0     10-03  Mg     2.3     10-03      PT/INR - ( 03 Oct 2023 05:30 )   PT: 10.7 sec;   INR: 0.94          PTT - ( 03 Oct 2023 05:30 )  PTT:34.2 sec  Urinalysis Basic - ( 03 Oct 2023 05:30 )    Color: x / Appearance: x / SG: x / pH: x  Gluc: 95 mg/dL / Ketone: x  / Bili: x / Urobili: x   Blood: x / Protein: x / Nitrite: x   Leuk Esterase: x / RBC: x / WBC x   Sq Epi: x / Non Sq Epi: x / Bacteria: x        RADIOLOGY:    Drug Screen:        REVIEW OF SYSTEMS:  CONSTITUTIONAL: No fever or fatigue O/N.   EYES: No eye pain, visual disturbances  ENMT:  No difficulty hearing. No throat pain  NECK: No pain or stiffness  RESPIRATORY: No cough, wheezing; No shortness of breath  CARDIOVASCULAR: No chest pain, palpitations.   GASTROINTESTINAL: Pt reports passing gas. No bowel movements. No abdominal or epigastric pain. No nausea, vomiting. GENITOURINARY: No dysuria, frequency, or incontinence  NEUROLOGICAL: No headaches, loss of strength, numbness, or tremors. No dizzinesss or lightheadedness with pain medications.   MUSCULOSKELETAL: Incisional back pain. No joint pain or swelling; No muscle, or extremity pain    PAIN ASSESSMENT: c/o sudden onset severe acute facial pain     PHYSICAL EXAM  GENERAL: Laying in bed, NAD  Neuro: CN II-XII PERRRL, EOMI  Cranial nerves grossly intact  Motor exam: MAEx4  Sensation intact to LT in UE/LE in 3 dermatomes  CHEST/LUNG: Clear to auscultation bilaterally; No rales, rhonchi, wheezing, or rubs  HEART: Regular rate and rhythm; No murmurs, rubs, or gallops  ABDOMEN: Soft, Nontender, Nondistended; Bowel sounds present  EXTREMITIES:  2+ Peripheral Pulses, No clubbing, cyanosis, or edema  SKIN: No rashes or lesions      ASSESSMENT:   72 yo L-handed M w/ PMHx of HTN, COPD, lung nodule, aortic aneurysm, trigeminal neuralgia s/p gamma knife 6/2023 to L trigeminal facial nerve w/o relief presents to the ED from outpatient office for evaluation of severe left facial pain.     PLAN:   - Pain:  Tylenol 1000mg every 8 hours  Increase Lyrica to 200mg every 8 hours   Oxycodone 5mg every 4 hours as needed for severe pain      - Bowel regimen: Senna    - Nausea ppx: Zofran standing  - Functional Goals: Pt will get OOB with PT today. Pt will resume previous level of activity without impairment from surgery.   - Additional Consults: None recommended.   - Additional Labs/Imaging:  None recommended.   - Follow up, Discharge Planning: pending post op PT/OT  - Pain Management follow up plan: will continue to follow    d/w

## 2023-10-03 NOTE — PROGRESS NOTE ADULT - PROVIDER SPECIALTY LIST ADULT
Cardiology
Hospitalist
Neurosurgery
Pain Medicine
Hospitalist
Neurosurgery
Neurosurgery

## 2023-10-03 NOTE — PROGRESS NOTE ADULT - SUBJECTIVE AND OBJECTIVE BOX
BRENT DIXON  73y  Male    Patient is a 73y old  Male who presents with a chief complaint of trigeminal neuralgia (03 Oct 2023 00:18)      INTERVAL HPI/OVERNIGHT EVENTS:      T(C): 36.6 (10-03-23 @ 09:06), Max: 36.8 (10-02-23 @ 16:05)  HR: 88 (10-03-23 @ 09:06) (71 - 88)  BP: 111/72 (10-03-23 @ 09:06) (102/65 - 127/67)  RR: 17 (10-03-23 @ 09:06) (16 - 17)  SpO2: 95% (10-03-23 @ 09:06) (94% - 98%)  Wt(kg): --Vital Signs Last 24 Hrs  T(C): 36.6 (03 Oct 2023 09:06), Max: 36.8 (02 Oct 2023 16:05)  T(F): 97.8 (03 Oct 2023 09:06), Max: 98.2 (02 Oct 2023 16:05)  HR: 88 (03 Oct 2023 09:06) (71 - 88)  BP: 111/72 (03 Oct 2023 09:06) (102/65 - 127/67)  BP(mean): 85 (03 Oct 2023 09:06) (85 - 85)  RR: 17 (03 Oct 2023 09:06) (16 - 17)  SpO2: 95% (03 Oct 2023 09:06) (94% - 98%)    Parameters below as of 03 Oct 2023 09:06  Patient On (Oxygen Delivery Method): room air        PHYSICAL EXAM:  GENERAL: NAD, well-groomed, well-developed  HEAD:  Atraumatic, Normocephalic  EYES: EOMI, PERRLA, conjunctiva and sclera clear  ENMT: No tonsillar erythema, exudates, or enlargement; Moist mucous membranes, Good dentition, No lesions  NECK: Supple, No JVD, Normal thyroid  NERVOUS SYSTEM:  Alert & Oriented X3, Good concentration; Motor Strength 5/5 B/L upper and lower extremities; DTRs 2+ intact and symmetric  CHEST/LUNG: Clear to auscultation bilaterally; No rales, rhonchi, wheezing, or rubs  HEART: Regular rate and rhythm; No murmurs, rubs, or gallops  ABDOMEN: Soft, Nontender, Nondistended; Bowel sounds present  EXTREMITIES:  WWP, No clubbing, cyanosis, or edema  Vascular: 2+ peripheral pulses x4  LYMPH: No lymphadenopathy noted  SKIN: No rashes or lesions    Consultant(s) Notes Reviewed:  [x ] YES  [ ] NO  Care Discussed with Consultants/Other Providers [ x] YES  [ ] NO    LABS:                        14.5   5.72  )-----------( 200      ( 03 Oct 2023 05:30 )             44.0     10-03    139  |  105  |  13  ----------------------------<  95  4.6   |  25  |  0.82    Ca    9.5      03 Oct 2023 05:30  Phos  4.0     10-03  Mg     2.3     10-03        PT/INR - ( 03 Oct 2023 05:30 )   PT: 10.7 sec;   INR: 0.94          PTT - ( 03 Oct 2023 05:30 )  PTT:34.2 sec  Urinalysis Basic - ( 03 Oct 2023 05:30 )    Color: x / Appearance: x / SG: x / pH: x  Gluc: 95 mg/dL / Ketone: x  / Bili: x / Urobili: x   Blood: x / Protein: x / Nitrite: x   Leuk Esterase: x / RBC: x / WBC x   Sq Epi: x / Non Sq Epi: x / Bacteria: x      CAPILLARY BLOOD GLUCOSE            Urinalysis Basic - ( 03 Oct 2023 05:30 )    Color: x / Appearance: x / SG: x / pH: x  Gluc: 95 mg/dL / Ketone: x  / Bili: x / Urobili: x   Blood: x / Protein: x / Nitrite: x   Leuk Esterase: x / RBC: x / WBC x   Sq Epi: x / Non Sq Epi: x / Bacteria: x        RADIOLOGY & ADDITIONAL TESTS:    Imaging Personally Reviewed:  [ ] YES  [ ] NO    HEALTH ISSUES - PROBLEM Dx:  Trigeminal neuralgia         DIXONBRENT KIDD  73y  Male    Patient is a 73y old  Male who presents with a chief complaint of trigeminal neuralgia (03 Oct 2023 00:18)      INTERVAL HPI/OVERNIGHT EVENTS: ANNY o/n       T(C): 36.6 (10-03-23 @ 09:06), Max: 36.8 (10-02-23 @ 16:05)  HR: 88 (10-03-23 @ 09:06) (71 - 88)  BP: 111/72 (10-03-23 @ 09:06) (102/65 - 127/67)  RR: 17 (10-03-23 @ 09:06) (16 - 17)  SpO2: 95% (10-03-23 @ 09:06) (94% - 98%)  Wt(kg): --Vital Signs Last 24 Hrs  T(C): 36.6 (03 Oct 2023 09:06), Max: 36.8 (02 Oct 2023 16:05)  T(F): 97.8 (03 Oct 2023 09:06), Max: 98.2 (02 Oct 2023 16:05)  HR: 88 (03 Oct 2023 09:06) (71 - 88)  BP: 111/72 (03 Oct 2023 09:06) (102/65 - 127/67)  BP(mean): 85 (03 Oct 2023 09:06) (85 - 85)  RR: 17 (03 Oct 2023 09:06) (16 - 17)  SpO2: 95% (03 Oct 2023 09:06) (94% - 98%)    Parameters below as of 03 Oct 2023 09:06  Patient On (Oxygen Delivery Method): room air        PHYSICAL EXAM:  Deferred     Consultant(s) Notes Reviewed:  [x ] YES  [ ] NO  Care Discussed with Consultants/Other Providers [ x] YES  [ ] NO    LABS:                        14.5   5.72  )-----------( 200      ( 03 Oct 2023 05:30 )             44.0     10-03    139  |  105  |  13  ----------------------------<  95  4.6   |  25  |  0.82    Ca    9.5      03 Oct 2023 05:30  Phos  4.0     10-03  Mg     2.3     10-03        PT/INR - ( 03 Oct 2023 05:30 )   PT: 10.7 sec;   INR: 0.94          PTT - ( 03 Oct 2023 05:30 )  PTT:34.2 sec  Urinalysis Basic - ( 03 Oct 2023 05:30 )    Color: x / Appearance: x / SG: x / pH: x  Gluc: 95 mg/dL / Ketone: x  / Bili: x / Urobili: x   Blood: x / Protein: x / Nitrite: x   Leuk Esterase: x / RBC: x / WBC x   Sq Epi: x / Non Sq Epi: x / Bacteria: x      CAPILLARY BLOOD GLUCOSE            Urinalysis Basic - ( 03 Oct 2023 05:30 )    Color: x / Appearance: x / SG: x / pH: x  Gluc: 95 mg/dL / Ketone: x  / Bili: x / Urobili: x   Blood: x / Protein: x / Nitrite: x   Leuk Esterase: x / RBC: x / WBC x   Sq Epi: x / Non Sq Epi: x / Bacteria: x        RADIOLOGY & ADDITIONAL TESTS:    Imaging Personally Reviewed:  [ ] YES  [ ] NO    HEALTH ISSUES - PROBLEM Dx:  Trigeminal neuralgia

## 2023-10-03 NOTE — PROGRESS NOTE ADULT - REASON FOR ADMISSION
trigeminal neuralgia

## 2023-10-04 ENCOUNTER — TRANSCRIPTION ENCOUNTER (OUTPATIENT)
Age: 73
End: 2023-10-04

## 2023-10-04 ENCOUNTER — APPOINTMENT (OUTPATIENT)
Dept: NEUROSURGERY | Facility: HOSPITAL | Age: 73
End: 2023-10-04

## 2023-10-04 VITALS
RESPIRATION RATE: 20 BRPM | DIASTOLIC BLOOD PRESSURE: 64 MMHG | HEART RATE: 69 BPM | OXYGEN SATURATION: 100 % | TEMPERATURE: 98 F | SYSTOLIC BLOOD PRESSURE: 105 MMHG

## 2023-10-04 LAB
ANION GAP SERPL CALC-SCNC: 8 MMOL/L — SIGNIFICANT CHANGE UP (ref 5–17)
BUN SERPL-MCNC: 20 MG/DL — SIGNIFICANT CHANGE UP (ref 7–23)
CALCIUM SERPL-MCNC: 9.5 MG/DL — SIGNIFICANT CHANGE UP (ref 8.4–10.5)
CHLORIDE SERPL-SCNC: 106 MMOL/L — SIGNIFICANT CHANGE UP (ref 96–108)
CO2 SERPL-SCNC: 26 MMOL/L — SIGNIFICANT CHANGE UP (ref 22–31)
CREAT SERPL-MCNC: 0.77 MG/DL — SIGNIFICANT CHANGE UP (ref 0.5–1.3)
EGFR: 95 ML/MIN/1.73M2 — SIGNIFICANT CHANGE UP
GLUCOSE SERPL-MCNC: 87 MG/DL — SIGNIFICANT CHANGE UP (ref 70–99)
HCT VFR BLD CALC: 43 % — SIGNIFICANT CHANGE UP (ref 39–50)
HGB BLD-MCNC: 14.1 G/DL — SIGNIFICANT CHANGE UP (ref 13–17)
MAGNESIUM SERPL-MCNC: 2.4 MG/DL — SIGNIFICANT CHANGE UP (ref 1.6–2.6)
MCHC RBC-ENTMCNC: 32 PG — SIGNIFICANT CHANGE UP (ref 27–34)
MCHC RBC-ENTMCNC: 32.8 GM/DL — SIGNIFICANT CHANGE UP (ref 32–36)
MCV RBC AUTO: 97.5 FL — SIGNIFICANT CHANGE UP (ref 80–100)
NRBC # BLD: 0 /100 WBCS — SIGNIFICANT CHANGE UP (ref 0–0)
PHOSPHATE SERPL-MCNC: 3.5 MG/DL — SIGNIFICANT CHANGE UP (ref 2.5–4.5)
PLATELET # BLD AUTO: 185 K/UL — SIGNIFICANT CHANGE UP (ref 150–400)
POTASSIUM SERPL-MCNC: 4.4 MMOL/L — SIGNIFICANT CHANGE UP (ref 3.5–5.3)
POTASSIUM SERPL-SCNC: 4.4 MMOL/L — SIGNIFICANT CHANGE UP (ref 3.5–5.3)
RBC # BLD: 4.41 M/UL — SIGNIFICANT CHANGE UP (ref 4.2–5.8)
RBC # FLD: 12.4 % — SIGNIFICANT CHANGE UP (ref 10.3–14.5)
SODIUM SERPL-SCNC: 140 MMOL/L — SIGNIFICANT CHANGE UP (ref 135–145)
WBC # BLD: 6.25 K/UL — SIGNIFICANT CHANGE UP (ref 3.8–10.5)
WBC # FLD AUTO: 6.25 K/UL — SIGNIFICANT CHANGE UP (ref 3.8–10.5)

## 2023-10-04 PROCEDURE — 70553 MRI BRAIN STEM W/O & W/DYE: CPT

## 2023-10-04 PROCEDURE — 85027 COMPLETE CBC AUTOMATED: CPT

## 2023-10-04 PROCEDURE — 83735 ASSAY OF MAGNESIUM: CPT

## 2023-10-04 PROCEDURE — 76000 FLUOROSCOPY <1 HR PHYS/QHP: CPT

## 2023-10-04 PROCEDURE — 93005 ELECTROCARDIOGRAM TRACING: CPT

## 2023-10-04 PROCEDURE — 83036 HEMOGLOBIN GLYCOSYLATED A1C: CPT

## 2023-10-04 PROCEDURE — A9585: CPT

## 2023-10-04 PROCEDURE — 86901 BLOOD TYPING SEROLOGIC RH(D): CPT

## 2023-10-04 PROCEDURE — 93306 TTE W/DOPPLER COMPLETE: CPT

## 2023-10-04 PROCEDURE — 70450 CT HEAD/BRAIN W/O DYE: CPT

## 2023-10-04 PROCEDURE — C1729: CPT

## 2023-10-04 PROCEDURE — 80048 BASIC METABOLIC PNL TOTAL CA: CPT

## 2023-10-04 PROCEDURE — 84100 ASSAY OF PHOSPHORUS: CPT

## 2023-10-04 PROCEDURE — 80053 COMPREHEN METABOLIC PANEL: CPT

## 2023-10-04 PROCEDURE — 86803 HEPATITIS C AB TEST: CPT

## 2023-10-04 PROCEDURE — 85610 PROTHROMBIN TIME: CPT

## 2023-10-04 PROCEDURE — 61790 TREAT TRIGEMINAL NERVE: CPT | Mod: LT

## 2023-10-04 PROCEDURE — 36415 COLL VENOUS BLD VENIPUNCTURE: CPT

## 2023-10-04 PROCEDURE — 86900 BLOOD TYPING SEROLOGIC ABO: CPT

## 2023-10-04 PROCEDURE — 85730 THROMBOPLASTIN TIME PARTIAL: CPT

## 2023-10-04 PROCEDURE — 85025 COMPLETE CBC W/AUTO DIFF WBC: CPT

## 2023-10-04 PROCEDURE — 86850 RBC ANTIBODY SCREEN: CPT

## 2023-10-04 PROCEDURE — 71045 X-RAY EXAM CHEST 1 VIEW: CPT

## 2023-10-04 PROCEDURE — 94640 AIRWAY INHALATION TREATMENT: CPT

## 2023-10-04 PROCEDURE — 99285 EMERGENCY DEPT VISIT HI MDM: CPT | Mod: 25

## 2023-10-04 DEVICE — CATH LUMBAR CLOSED TIP: Type: IMPLANTABLE DEVICE | Site: LEFT | Status: FUNCTIONAL

## 2023-10-04 RX ORDER — HYDROMORPHONE HYDROCHLORIDE 2 MG/ML
0.5 INJECTION INTRAMUSCULAR; INTRAVENOUS; SUBCUTANEOUS
Refills: 0 | Status: DISCONTINUED | OUTPATIENT
Start: 2023-10-04 | End: 2023-10-04

## 2023-10-04 RX ORDER — NICOTINE POLACRILEX 2 MG
1 GUM BUCCAL DAILY
Refills: 0 | Status: DISCONTINUED | OUTPATIENT
Start: 2023-10-04 | End: 2023-10-04

## 2023-10-04 RX ORDER — OXYCODONE HYDROCHLORIDE 5 MG/1
5 TABLET ORAL EVERY 4 HOURS
Refills: 0 | Status: DISCONTINUED | OUTPATIENT
Start: 2023-10-04 | End: 2023-10-04

## 2023-10-04 RX ORDER — SODIUM CHLORIDE 9 MG/ML
1000 INJECTION INTRAMUSCULAR; INTRAVENOUS; SUBCUTANEOUS
Refills: 0 | Status: DISCONTINUED | OUTPATIENT
Start: 2023-10-04 | End: 2023-10-04

## 2023-10-04 RX ORDER — DEXAMETHASONE 0.5 MG/5ML
2 ELIXIR ORAL EVERY 8 HOURS
Refills: 0 | Status: DISCONTINUED | OUTPATIENT
Start: 2023-10-05 | End: 2023-10-04

## 2023-10-04 RX ORDER — AMLODIPINE BESYLATE 2.5 MG/1
1 TABLET ORAL
Qty: 30 | Refills: 0
Start: 2023-10-04 | End: 2023-11-02

## 2023-10-04 RX ORDER — DEXAMETHASONE 0.5 MG/5ML
4 ELIXIR ORAL EVERY 8 HOURS
Refills: 0 | Status: DISCONTINUED | OUTPATIENT
Start: 2023-10-04 | End: 2023-10-04

## 2023-10-04 RX ORDER — DEXAMETHASONE 0.5 MG/5ML
1 ELIXIR ORAL EVERY 8 HOURS
Refills: 0 | Status: CANCELLED | OUTPATIENT
Start: 2023-10-06 | End: 2023-10-04

## 2023-10-04 RX ORDER — FLUTICASONE PROPIONATE 50 MCG
1 SPRAY, SUSPENSION NASAL
Refills: 0 | Status: DISCONTINUED | OUTPATIENT
Start: 2023-10-04 | End: 2023-10-04

## 2023-10-04 RX ORDER — AMLODIPINE BESYLATE 2.5 MG/1
10 TABLET ORAL DAILY
Refills: 0 | Status: DISCONTINUED | OUTPATIENT
Start: 2023-10-04 | End: 2023-10-04

## 2023-10-04 RX ORDER — DEXAMETHASONE 0.5 MG/5ML
ELIXIR ORAL
Refills: 0 | Status: DISCONTINUED | OUTPATIENT
Start: 2023-10-04 | End: 2023-10-04

## 2023-10-04 RX ORDER — OXYCODONE HYDROCHLORIDE 5 MG/1
1 TABLET ORAL
Qty: 12 | Refills: 0
Start: 2023-10-04 | End: 2023-10-06

## 2023-10-04 RX ORDER — DEXAMETHASONE 0.5 MG/5ML
4 ELIXIR ORAL
Qty: 21 | Refills: 0
Start: 2023-10-04 | End: 2023-10-06

## 2023-10-04 RX ORDER — GABAPENTIN 400 MG/1
1 CAPSULE ORAL
Refills: 0 | DISCHARGE

## 2023-10-04 RX ADMIN — OXYCODONE HYDROCHLORIDE 5 MILLIGRAM(S): 5 TABLET ORAL at 08:57

## 2023-10-04 RX ADMIN — SODIUM CHLORIDE 65 MILLILITER(S): 9 INJECTION INTRAMUSCULAR; INTRAVENOUS; SUBCUTANEOUS at 12:15

## 2023-10-04 RX ADMIN — Medication 200 MILLIGRAM(S): at 05:35

## 2023-10-04 RX ADMIN — Medication 1000 MILLIGRAM(S): at 06:36

## 2023-10-04 RX ADMIN — Medication 1000 MILLIGRAM(S): at 05:36

## 2023-10-04 RX ADMIN — OXYCODONE HYDROCHLORIDE 5 MILLIGRAM(S): 5 TABLET ORAL at 03:38

## 2023-10-04 RX ADMIN — OXYCODONE HYDROCHLORIDE 5 MILLIGRAM(S): 5 TABLET ORAL at 07:57

## 2023-10-04 RX ADMIN — OXYCODONE HYDROCHLORIDE 5 MILLIGRAM(S): 5 TABLET ORAL at 04:38

## 2023-10-04 RX ADMIN — AMLODIPINE BESYLATE 10 MILLIGRAM(S): 2.5 TABLET ORAL at 05:35

## 2023-10-04 RX ADMIN — Medication 1 SPRAY(S): at 05:36

## 2023-10-04 RX ADMIN — HYDROMORPHONE HYDROCHLORIDE 0.5 MILLIGRAM(S): 2 INJECTION INTRAMUSCULAR; INTRAVENOUS; SUBCUTANEOUS at 16:40

## 2023-10-04 NOTE — DISCHARGE NOTE PROVIDER - CARE PROVIDERS DIRECT ADDRESSES
[email or web address]

## 2023-10-04 NOTE — DISCHARGE NOTE PROVIDER - NSDCCPCAREPLAN_GEN_ALL_CORE_FT
PRINCIPAL DISCHARGE DIAGNOSIS  Diagnosis: Trigeminal neuralgia  Assessment and Plan of Treatment:

## 2023-10-04 NOTE — DISCHARGE NOTE PROVIDER - NSDCFUADDINST_GEN_ALL_CORE_FT
Neurosurgery follow up appointment date/time:  - are staples/sutures in place?  - what day should staples/sutures be removed (POD 10-14)?  - please call the office to confirm appointment:     Wound Care:  - can patient shower?  - does dressing need to be changed/removed?  - no picking at incision  - wears glasses?   - pressure ulcer?     Devices:  - does patient need collar or brace or helmet?   - does collar/brace need to be worn at all times or just when OOB?  - RW or cane for ambulation?    Drains/Lines:  - PICC in place? ID follow up? (Paper Rx for: antibiotics, heparin flush, weekly lab draws)  - CORINA in place? Management?  - yousif in place? Management/Urology follow up?  - Tracheostomy?  - PEG tube?      Activity:  - fatigue is common after surgery, rest if you feel tired   - no bending, lifting, twisting or heavy lifting   - walking is recommended, ambulate as tolerated  - you may shower when you get home, keep your incision dry  - no soaking in a tub/pool/hot tub   - no driving within 24 hours of anesthesia or while taking prescription pain medications   - keep hydrated, drink plenty of water   - skullbase precautions: no nose blowing, sneeze with mouth open, no drinking out of a straw, no straining      Inpatient consults:  - final recommendations  - you will need follow up with....    Please also follow up with your primary care doctor.     Pain Expectations:  - pain after surgery is expected  - please take pain meds as prescribed     Medications:  - changes to home meds (ex. AED's)?  - new meds?  - pain meds?  - when can antiplatelets or anticoagulants be restarted?  - were adverse affects of meds discussed with patients?   - pain medications can cause constipation, you should eat a high fiber diet and may take a stool softener while on pain meds   - Avoid taking Advil (ibuprofen), Motrin (naproxen), or Aspirin for pain as they can cause bleeding     Call the office or come to ED if:  - wound has drainage or bleeding, increased redness or pain at incision site, neurological change, fever (>101), chills, night sweats, syncope, nausea/vomiting, chest pain, shortness of breath      Playback:  - are discharge instruction on playback?  - is a picture of the incision on playback?     WITHIN 24 HOURS OF DISCHARGE, PLEASE CONTACT NEURO PA  WITH ANY QUESTIONS OR CONCERNS: [phone]   OTHERWISE, PLEASE CALL THE OFFICE WITH ANY QUESTIONS OR CONCERNS: [phone] Neurosurgery follow up appointment date/time:  - follow up in the office for a wound check and staple removal   - please call the office to confirm appointment: [phone]    Wound Care:  - can patient shower?  - does dressing need to be changed/removed?  - no picking at incision    Activity:  - fatigue is common after surgery, rest if you feel tired   - no bending, lifting, twisting or heavy lifting   - walking is recommended, ambulate as tolerated  - you may shower when you get home, keep your incision dry  - no soaking in a tub/pool/hot tub   - no driving within 24 hours of anesthesia or while taking prescription pain medications   - keep hydrated, drink plenty of water     Inpatient consults:  - pain management     Please also follow up with your primary care doctor.     Pain Expectations:  - pain after surgery is expected  - please take pain meds as prescribed     Medications:  - changes to home meds (ex. AED's)?  - new meds?  - pain meds?  - adverse affects of meds discussed with patients?   - pain medications can cause constipation, you should eat a high fiber diet and may take a stool softener while on pain meds   - Avoid taking Advil (ibuprofen), Motrin (naproxen), or Aspirin for pain as they can cause bleeding     Call the office or come to ED if:  - wound has drainage or bleeding, increased redness or pain at incision site, neurological change, fever (>101), chills, night sweats, syncope, nausea/vomiting, chest pain, shortness of breath      Playback:  - see [facility] for a copy of your discharge paperwork     WITHIN 24 HOURS OF DISCHARGE, PLEASE CONTACT NEURO PA  WITH ANY QUESTIONS OR CONCERNS: [phone]   OTHERWISE, PLEASE CALL THE OFFICE WITH ANY QUESTIONS OR CONCERNS: [phone] Neurosurgery follow up appointment date/time:  - follow up in the office for a wound check and staple removal   - please call the office to confirm appointment: [phone]    Wound Care:  - patient can shower  - no picking at incision    Activity:  - fatigue is common after surgery, rest if you feel tired   - no bending, lifting, twisting or heavy lifting   - walking is recommended, ambulate as tolerated  - you may shower when you get home, keep your incision dry  - no soaking in a tub/pool/hot tub   - no driving within 24 hours of anesthesia or while taking prescription pain medications   - keep hydrated, drink plenty of water     Inpatient consults:  - pain management     Please also follow up with your primary care doctor.     Pain Expectations:  - pain after surgery is expected  - please take pain meds as prescribed     Medications:  - Continue decadron taper plan as instructed, continue Lyrica 3x a day until follow up appointment, please STOP gabapentin while taking Lyrica   - Continue amlodipine for blood pressure control   - For pain continue to take tylenol and if persistent can take oxycodone 5 mg every 6  hours as needed  - pain medications can cause constipation, you should eat a high fiber diet and may take a stool softener while on pain meds   - Avoid taking Advil (ibuprofen), Motrin (naproxen), or Aspirin for pain as they can cause bleeding     Call the office or come to ED if:  - wound has drainage or bleeding, increased redness or pain at incision site, neurological change, fever (>101), chills, night sweats, syncope, nausea/vomiting, chest pain, shortness of breath      Playback:  - see [facility] for a copy of your discharge paperwork     WITHIN 24 HOURS OF DISCHARGE, PLEASE CONTACT NEURO PA  WITH ANY QUESTIONS OR CONCERNS: [phone]   OTHERWISE, PLEASE CALL THE OFFICE WITH ANY QUESTIONS OR CONCERNS: [phone]

## 2023-10-04 NOTE — BRIEF OPERATIVE NOTE - NSICDXBRIEFPROCEDURE_GEN_ALL_CORE_FT
PROCEDURES:  Rhizotomy of trigeminal nerve with stereotactic guidance 04-Oct-2023 15:14:18  Eric Jorge

## 2023-10-04 NOTE — DIETITIAN INITIAL EVALUATION ADULT - OTHER INFO
74 yo L-handed M w/ PMHx of HTN, COPD, lung nodule, aortic aneurysm, trigeminal neuralgia s/p gamma knife 6/2023 to L trigeminal facial nerve w/o relief presents to the ED from outpatient office for evaluation of severe left facial pain. Plan for OR 10/4 for trigeminal rhizotomy.    On assessment, pt resting in bed with wife via phone. Labs and medication orders reviewed. NPO. Pt reports poor intake PTA secondary to facial pain, wife endorses pt intake significantly less than usual for past 2 years. Reports pt with ~27lb wt loss over past 2 years, admission wt 150lb indicates 15% wt loss - not clinically significant with consideration for time period. Nutrition-focused physical examination reveals moderate muscle and fat wasting. Per ASPEN guidelines, pt meets criteria for moderate malnutrition - see nutrition risk notification. Pt denies difficulty chewing/swallowing. Confirms pineapple allergy. No Worship/ethnic/cultural food preferences noted. No pressure injuries or edema documented, Wilbert score 21. Recommended oral nutrition supplements, pt receptive. See nutrition recommendations. RD to follow-up per protocol.

## 2023-10-04 NOTE — DISCHARGE NOTE PROVIDER - DETAILS OF MALNUTRITION DIAGNOSIS/DIAGNOSES
This patient has been assessed with a concern for Malnutrition and was treated during this hospitalization for the following Nutrition diagnosis/diagnoses:     -  10/04/2023: Moderate protein-calorie malnutrition

## 2023-10-04 NOTE — DISCHARGE NOTE PROVIDER - CARE PROVIDER_API CALL
Cecilio Staton.  Neurosurgery  [address] [facility] [place] [address]  Phone: [phone]  Fax: [phone]  Follow Up Time:

## 2023-10-04 NOTE — DISCHARGE NOTE PROVIDER - NSDCCPTREATMENT_GEN_ALL_CORE_FT
PRINCIPAL PROCEDURE  Procedure: Rhizotomy of trigeminal nerve with stereotactic guidance  Findings and Treatment:

## 2023-10-04 NOTE — DISCHARGE NOTE NURSING/CASE MANAGEMENT/SOCIAL WORK - NSDCFUADDAPPT_GEN_ALL_CORE_FT
Please follow up with Dr. Staton, call the office to make/confirm appointment at [phone]     Please follow up with  if needed for pain management    Please follow up with your primary care doctor

## 2023-10-04 NOTE — DIETITIAN INITIAL EVALUATION ADULT - EDUCATION DIETARY MODIFICATIONS
Educated on strategies to promote intake and discussed high kcal/protein foods. Pt aware RD remains available for additional questions/concerns./(2) meets goals/outcomes/verbalization

## 2023-10-04 NOTE — DISCHARGE NOTE NURSING/CASE MANAGEMENT/SOCIAL WORK - PATIENT PORTAL LINK FT
You can access the FollowMyHealth Patient Portal offered by [facility] by registering at the following website: [email or web address]. By joining [facility]’s FollowMyHealth portal, you will also be able to view your health information using other applications (apps) compatible with our system.

## 2023-10-04 NOTE — DISCHARGE NOTE PROVIDER - NSDCFUADDAPPT_GEN_ALL_CORE_FT
Please follow up with Dr. Staton, call the office to make/confirm appointment at [phone]     Please follow up with  if needed for pain management    Please follow up with your primary care doctor

## 2023-10-04 NOTE — DISCHARGE NOTE PROVIDER - NSDCMRMEDTOKEN_GEN_ALL_CORE_FT
gabapentin 600 mg oral tablet: 1 tab(s) orally 3 times a day   amLODIPine 10 mg oral tablet: 1 tab(s) orally once a day  dexAMETHasone 1 mg oral tablet: 4 tab(s) orally every 8 hours Please follow taper plan: Please take 4 tablets every 8 hours for 1 day  MDD= 12  Please take 2 tablets every 8 hours for 1 day MDD = 6  Please take 1 tablet every 8 hours for 1 day MDD = 3  oxyCODONE 5 mg oral tablet: 1 tab(s) orally every 6 hours as needed for Severe Pain (7 - 10) Please take max 4 tablets per day for pain; MDD = 4  pregabalin 200 mg oral capsule: 1 cap(s) orally every 8 hours Please continue taking MDD: 3 per day

## 2023-10-04 NOTE — DISCHARGE NOTE PROVIDER - HOSPITAL COURSE
HPI:    Hospital Course:    Patient evaluated by PT/OT who recommended:  Patient is going home? rehab? hospice? Facility Name:     Hospital course c/b:     Exam on day of discharge:    Checklist:   - Obtained follow up appointment from NP  - Reviewed final recommendations of inpatient consults  - review discharge planning on provider handoff  - post op imaging completed  - Neurologically stable for discharge  - Vitals stable for discharge   - Afebrile for discharge  - WBC is stable  - Sodium level is normal  - Pain is adequately controlled  - Pt has PICC/walker/brace/collar   - LACE score (10 or > needs PCP apt)   - stroke patient? Discharge NIHSS score   HPI:  74 y/o M w/ PMH of HTN, HLD (not on medication), COPD, lung nodule, aortic aneurysm, smoker (7-9 cigarettes per day x 40 years), trigeminal neuralgia s/p gamma knife to L trigeminal facial nerve w/o relief 6/2023 presents to [facility] from outpatient office for evaluation of severe L sided facial pain x1 year. Patient describes pain as located over the L cheek to jaw area, intermittently radiating to entire left face, occurs intermittent during the day but constant at night, varying from sharp and dull, rated 9/10 when severe, Episodes of pain typically last 45 minutes to 1 hour, has taken tylenol for pain without relief. Patient takes gabapentin 600mg tid at home, if pain is severe 4-5 times/day. Denies headaches, nausea, vomiting, dizziness, fever, chills, weakness, numbness, chest pain, vision changes, seizures, loss of consciousness, shortness of breath, palpitations, diarrhea, abdominal pain.      Hospital Course:  9/29: presented to ER from outpatient office for evaluation of severe left facial pain. Admitted to [facility] under neurosurgery. MRI completed.   9/30: Endorsing severe pain on left side of face. ANNY. Resumed home amlodipine and lisinopril. CT everette completed. Pain management consulted, recommended starting tylenol 1gq8, lyrica 150q8, valium 2q8 prn, d/c gabapentin.   10/1: ANNY overnight. Pending TTE  10/2: ANNY overnight, had BM yesterday, pre op for rhizotomy tuesday  10/3: ANNY overnight, exam stable, OR tomorrow    Patient optimized for discharge home     Hospital course uncomplicated     Exam on day of discharge:    Checklist:   - Obtained follow up appointment from NP  - Reviewed final recommendations of inpatient consults  - review discharge planning on provider handoff  - post op imaging completed  - Neurologically stable for discharge  - Vitals stable for discharge   - Afebrile for discharge  - WBC is stable  - Sodium level is normal  - Pain is adequately controlled  - Pt has PICC/walker/brace/collar   - LACE score (10 or > needs PCP apt)   - stroke patient? Discharge NIHSS score   HPI:  72 y/o M w/ PMH of HTN, HLD (not on medication), COPD, lung nodule, aortic aneurysm, smoker (7-9 cigarettes per day x 40 years), trigeminal neuralgia s/p gamma knife to L trigeminal facial nerve w/o relief 6/2023 presents to [facility] from outpatient office for evaluation of severe L sided facial pain x1 year. Patient describes pain as located over the L cheek to jaw area, intermittently radiating to entire left face, occurs intermittent during the day but constant at night, varying from sharp and dull, rated 9/10 when severe, Episodes of pain typically last 45 minutes to 1 hour, has taken tylenol for pain without relief. Patient takes gabapentin 600mg tid at home, if pain is severe 4-5 times/day. Denies headaches, nausea, vomiting, dizziness, fever, chills, weakness, numbness, chest pain, vision changes, seizures, loss of consciousness, shortness of breath, palpitations, diarrhea, abdominal pain.      Hospital Course:  9/29: presented to ER from outpatient office for evaluation of severe left facial pain. Admitted to [facility] under neurosurgery. MRI completed.   9/30: Endorsing severe pain on left side of face. ANNY. Resumed home amlodipine and lisinopril. CT everette completed. Pain management consulted, recommended starting tylenol 1gq8, lyrica 150q8, valium 2q8 prn, d/c gabapentin.   10/1: ANNY overnight. Pending TTE  10/2: ANNY overnight, had BM yesterday, pre op for rhizotomy tuesday  10/3: ANNY overnight, exam stable, OR tomorrow.   10/4: OR today, s/p Left trigeminal nerve rhizotomy.     Patient optimized for discharge home     Hospital course uncomplicated     Exam on day of discharge:  General: patient seen laying supine in bed in NAD  Neuro: AAOx3, FC, OE spontaneously, speech clear and fluent, CNII-XI grossly intact, face symmetric, no pronator drift, finger to nose intact, strength 5/5 b/l UE and LE, sensation intact to light touch throughout  HEENT: PERRL, EOMI  Neck: supple  Cardiac: RRR, S1S2  Pulmonary: chest rise symmetric  Abdomen: soft, nontender, nondistended  Ext: perfusing well  Skin: warm, dry      Checklist: Neuro stable, vitals stable, pain well controlled, outpatient appointment confirmed

## 2023-10-04 NOTE — DIETITIAN NUTRITION RISK NOTIFICATION - ADDITIONAL COMMENTS/DIETITIAN RECOMMENDATIONS
As medically feasible status post OR, recommend Regular diet with Ensure High Protein (350kcal, 20g protein) x2/day.   >>Encourage & monitor PO intake. [place] dietary preferences as able.

## 2023-10-04 NOTE — DIETITIAN INITIAL EVALUATION ADULT - PERTINENT LABORATORY DATA
10-04    140  |  106  |  20  ----------------------------<  87  4.4   |  26  |  0.77    Ca    9.5      04 Oct 2023 05:30  Phos  3.5     10-04  Mg     2.4     10-04    A1C with Estimated Average Glucose Result: 6.0 % (09-30-23 @ 14:07)

## 2023-10-04 NOTE — DIETITIAN INITIAL EVALUATION ADULT - ADD RECOMMEND
1. As medically feasible status post OR, recommend Regular diet with Ensure High Protein (350kcal, 20g protein) x2/day.   >>Encourage & monitor PO intake. [place] dietary preferences as able.   2. Monitor GI tolerance, weight trends, labs, & skin integrity.  3. Defer bowel and pain regimens to team.   4. RD to remain available for diet education/intervention prn.

## 2023-10-04 NOTE — BRIEF OPERATIVE NOTE - OPERATION/FINDINGS
Left Trigeminal Rhizotomy  Findings: Left sided two lesioning were done at 65 Degree celsius x 90 secs under C-ARM, neuronavigation and IONM.

## 2023-10-12 DIAGNOSIS — F17.210 NICOTINE DEPENDENCE, CIGARETTES, UNCOMPLICATED: ICD-10-CM

## 2023-10-12 DIAGNOSIS — J44.9 CHRONIC OBSTRUCTIVE PULMONARY DISEASE, UNSPECIFIED: ICD-10-CM

## 2023-10-12 DIAGNOSIS — I10 ESSENTIAL (PRIMARY) HYPERTENSION: ICD-10-CM

## 2023-10-12 DIAGNOSIS — I08.3 COMBINED RHEUMATIC DISORDERS OF MITRAL, AORTIC AND TRICUSPID VALVES: ICD-10-CM

## 2023-10-12 DIAGNOSIS — R91.1 SOLITARY PULMONARY NODULE: ICD-10-CM

## 2023-10-12 DIAGNOSIS — Z91.018 ALLERGY TO OTHER FOODS: ICD-10-CM

## 2023-10-12 DIAGNOSIS — I77.810 THORACIC AORTIC ECTASIA: ICD-10-CM

## 2023-10-12 DIAGNOSIS — R73.03 PREDIABETES: ICD-10-CM

## 2023-10-12 DIAGNOSIS — I45.10 UNSPECIFIED RIGHT BUNDLE-BRANCH BLOCK: ICD-10-CM

## 2023-10-12 DIAGNOSIS — G50.0 TRIGEMINAL NEURALGIA: ICD-10-CM

## 2023-10-12 DIAGNOSIS — K59.00 CONSTIPATION, UNSPECIFIED: ICD-10-CM

## 2023-10-12 DIAGNOSIS — E78.5 HYPERLIPIDEMIA, UNSPECIFIED: ICD-10-CM

## 2023-10-12 DIAGNOSIS — E44.0 MODERATE PROTEIN-CALORIE MALNUTRITION: ICD-10-CM

## 2023-10-27 ENCOUNTER — APPOINTMENT (OUTPATIENT)
Dept: NEUROSURGERY | Facility: CLINIC | Age: 73
End: 2023-10-27
Payer: SELF-PAY

## 2023-10-27 VITALS
HEART RATE: 71 BPM | HEIGHT: 68 IN | WEIGHT: 150 LBS | DIASTOLIC BLOOD PRESSURE: 67 MMHG | RESPIRATION RATE: 18 BRPM | SYSTOLIC BLOOD PRESSURE: 113 MMHG | OXYGEN SATURATION: 98 % | BODY MASS INDEX: 22.73 KG/M2

## 2023-10-27 DIAGNOSIS — G50.0 TRIGEMINAL NEURALGIA: ICD-10-CM

## 2023-10-27 PROCEDURE — 99024 POSTOP FOLLOW-UP VISIT: CPT

## 2023-10-27 RX ORDER — METHYLPREDNISOLONE 4 MG/1
4 TABLET ORAL
Qty: 1 | Refills: 0 | Status: ACTIVE | COMMUNITY
Start: 2023-10-27 | End: 1900-01-01

## 2024-02-16 NOTE — DISCHARGE NOTE PROVIDER - NPI NUMBER (FOR SYSADMIN USE ONLY) :
[id number]    Plan of Care Reviewed With: patient    Overall Patient Progress: no change    Outcome Evaluation: Pt is A & O x3 (not situation) w/ intermittent confusion on 3L of O2 via NC. Pt c/o 4/10 head pain - administered PRN Tylenol. Pt also c/o nausea - administered PRN Zofran & Compazine. Denies chest pain & SOB. Pt lost IV access during shift. Upon skin assessment - scattered abrasions & scabs noted. Pt has external catheter in place - changed during shift & incontinence cares provided. Pt did not get oob, is assist x1 & uses call light appropriately.     Shift Updates  L PIV removed during shift. RN Flyer contacted for new IV placement.    Vitals: /76 (BP Location: Left arm, Patient Position: Semi-العراقي's, Cuff Size: Adult Regular)   Pulse 105   Temp 98  F (36.7  C) (Oral)   Resp 14   Ht 1.524 m (5')   Wt 61.4 kg (135 lb 5.8 oz)   SpO2 98%   BMI 26.44 kg/m      Plan: TBD, care conference today at 1 PM. Continue w/ plan of care.    Pt is now comfort cares.       [[id number]]

## 2024-05-07 ENCOUNTER — APPOINTMENT (RX ONLY)
Dept: URBAN - METROPOLITAN AREA CLINIC 184 | Facility: CLINIC | Age: 74
Setting detail: DERMATOLOGY
End: 2024-05-07

## 2024-05-07 DIAGNOSIS — B35.3 TINEA PEDIS: ICD-10-CM

## 2024-05-07 DIAGNOSIS — D49.2 NEOPLASM OF UNSPECIFIED BEHAVIOR OF BONE, SOFT TISSUE, AND SKIN: ICD-10-CM

## 2024-05-07 PROCEDURE — ? PRESCRIPTION

## 2024-05-07 PROCEDURE — ? BIOPSY BY SHAVE METHOD

## 2024-05-07 PROCEDURE — 11102 TANGNTL BX SKIN SINGLE LES: CPT

## 2024-05-07 PROCEDURE — ? COUNSELING

## 2024-05-07 PROCEDURE — ? PRESCRIPTION MEDICATION MANAGEMENT

## 2024-05-07 PROCEDURE — 99203 OFFICE O/P NEW LOW 30 MIN: CPT | Mod: 25

## 2024-05-07 RX ORDER — ECONAZOLE NITRATE 10 MG/G
CREAM TOPICAL
Qty: 85 | Refills: 4 | Status: ERX | COMMUNITY
Start: 2024-05-07

## 2024-05-07 RX ORDER — TERBINAFINE HYDROCHLORIDE 250 MG/1
TABLET ORAL
Qty: 28 | Refills: 0 | Status: ERX | COMMUNITY
Start: 2024-05-07

## 2024-05-07 RX ADMIN — TERBINAFINE HYDROCHLORIDE: 250 TABLET ORAL at 00:00

## 2024-05-07 RX ADMIN — ECONAZOLE NITRATE: 10 CREAM TOPICAL at 00:00

## 2024-05-07 ASSESSMENT — LOCATION ZONE DERM
LOCATION ZONE: TOE
LOCATION ZONE: FEET

## 2024-05-07 ASSESSMENT — LOCATION SIMPLE DESCRIPTION DERM
LOCATION SIMPLE: PLANTAR SURFACE OF RIGHT 4TH TOE
LOCATION SIMPLE: RIGHT PLANTAR SURFACE

## 2024-05-07 ASSESSMENT — LOCATION DETAILED DESCRIPTION DERM
LOCATION DETAILED: RIGHT PLANTAR FOREFOOT OVERLYING 4TH METATARSAL
LOCATION DETAILED: RIGHT MEDIAL PLANTAR 4TH TOE

## 2024-05-07 NOTE — PROCEDURE: PRESCRIPTION MEDICATION MANAGEMENT
Initiate Treatment: Terbinefine 250mg QD \\nEconazole twice a day to affected area
Detail Level: Zone
Render In Strict Bullet Format?: No

## 2024-05-07 NOTE — PROCEDURE: BIOPSY BY SHAVE METHOD
Detail Level: Detailed
Depth Of Biopsy: dermis
Was A Bandage Applied: Yes
Size Of Lesion In Cm: 0.9
X Size Of Lesion In Cm: 0
Biopsy Type: H and E
Biopsy Method: Dermablade
Anesthesia Type: 1% lidocaine with epinephrine
Anesthesia Volume In Cc: 0.5
Hemostasis: Drysol
Wound Care: Petrolatum
Dressing: bandage
Destruction After The Procedure: No
Type Of Destruction Used: Curettage
Curettage Text: The wound bed was treated with curettage after the biopsy was performed.
Cryotherapy Text: The wound bed was treated with cryotherapy after the biopsy was performed.
Electrodesiccation Text: The wound bed was treated with electrodesiccation after the biopsy was performed.
Electrodesiccation And Curettage Text: The wound bed was treated with electrodesiccation and curettage after the biopsy was performed.
Silver Nitrate Text: The wound bed was treated with silver nitrate after the biopsy was performed.
Lab: -[id number]
Consent: Written consent was obtained and risks were reviewed including but not limited to scarring, infection, bleeding, scabbing, incomplete removal, nerve damage and allergy to anesthesia.
Post-Care Instructions: I reviewed with the patient in detail post-care instructions. Patient is to keep the biopsy site dry overnight, and then apply bacitracin twice daily until healed. Patient may apply hydrogen peroxide soaks to remove any crusting.
Notification Instructions: Patient will be notified of biopsy results. However, patient instructed to call the office if not contacted within 2 weeks.
Billing Type: Third-Party Bill
Information: Selecting Yes will display possible errors in your note based on the variables you have selected. This validation is only offered as a suggestion for you. PLEASE NOTE THAT THE VALIDATION TEXT WILL BE REMOVED WHEN YOU FINALIZE YOUR NOTE. IF YOU WANT TO FAX A PRELIMINARY NOTE YOU WILL NEED TO TOGGLE THIS TO 'NO' IF YOU DO NOT WANT IT IN YOUR FAXED NOTE.

## 2024-12-05 NOTE — PATIENT PROFILE ADULT - DOES PATIENT HAVE ADVANCE DIRECTIVE
" Medical Oncology Virtual/Call    Toro Lujan  [id number]  12/5/2024    68 yo male known to me with Met  TCC on SEGE 1822: Disitamab Vedotin, cohorts A and B in HER2-overexpressing IHC 1+, 2+, 3+ for locally advanced unresectable or metastatic UC who have received prior platinum-based chemotherapy  Dose reduced to 1 mg/kg IV every 2 weeks sec neuropathy  We have elected to hold therapy sec  AEs  Peripheral neuropathy remains but a bit better  Near CR by RECIST  Continue on hold  Scans showed near CR  Favored to continue holding for now if trial allows it    Topher Castanon MD, FACP  Chief, Solid Tumor Oncology Division   Medical Oncology  Professor of Medicine and Urology  /[facility]     Lab Results   Component Value Date    WBC 10.0 11/13/2024    HGB 15.0 11/13/2024    HCT 44.3 11/13/2024    MCV 90 11/13/2024     11/13/2024     Lab Results   Component Value Date    GLUCOSE 120 (H) 11/13/2024    CALCIUM 9.3 11/13/2024     (L) 11/13/2024    K 4.5 11/13/2024    CO2 27 11/13/2024     11/13/2024    BUN 22 11/13/2024    CREATININE 1.04 11/13/2024     No results found for: \"TESTOSTERONE\"  Lab Results   Component Value Date    ALT 26 11/13/2024    AST 19 11/13/2024    ALKPHOS 118 11/13/2024    BILITOT 0.7 11/13/2024     Narrative & Impression   Interpreted By:  Satya Alexander,  and Stephan Connell   STUDY:  CT CHEST ABDOMEN PELVIS W IV CONTRAST;  12/4/2024 1:41 pm      INDICATION:  Signs/Symptoms:staging per clinical trial. 69-year-old with  metastatic urothelial carcinoma status post radical cystectomy.  Patient is status post adjuvant platinum-based combination  chemotherapy (completed 5/2022) followed by avelumab then developed  disease progression (2 lymph nodes) and subsequently started  disitamab vedotin clinical trial.      ,Z00.6 Encounter for examination for normal comparison and control in  clinical research program      COMPARISON:  CT chest abdomen pelvis on 10/29/2024.    "   ACCESSION NUMBER(S):  [id number]      ORDERING CLINICIAN:  YRN MANUEL      TECHNIQUE:  CT of the chest, abdomen, and pelvis was performed.  Contiguous axial  images were obtained at 3 mm slice thickness through the chest,  abdomen and pelvis. Coronal and sagittal reconstructions at 3 mm  slice thickness were performed. 90 ML of Omnipaque 350 was  administered intravenously without immediate complication.      FINDINGS:  CHEST:      LUNG/PLEURA/LARGE AIRWAYS:  Trachea and central airways are patent. Minimal right basilar  subsegmental atelectasis. Similar size of left upper lobe medial  nodule measuring 0.5 cm (series 204, image 102), previously 0.4 cm  and left lower lobe nodule measuring 0.3 cm (series 204, image 187),  previously 0.2 cm. Stable right upper lobe subcentimeter benign  calcified granuloma (series 204, image 220). No new or enlarging lung  nodules.      VESSELS:  Aorta and pulmonary arteries are normal caliber.  Minimal  atherosclerotic changes are noted of the aorta and branching vessels.  Moderate coronary artery calcifications are present.      HEART:  The heart is normal in size.  There is no pericardial effusion.      MEDIASTINUM AND EBONY:  No mediastinal, hilar or axillary lymphadenopathy is present.  Small  hiatal hernia with distal esophageal thickening, similar to prior.      CHEST WALL AND LOWER NECK:  The soft tissues of the chest wall demonstrate no gross abnormality.  The partially visualized thyroid gland appears within normal limits.      ABDOMEN:      LIVER:  Liver is normal in size. Similar size of segment 8 liver cyst (series  201, image 83) measuring 1.2 cm, previously 1.4 cm. Remaining  scattered hypodense liver lesions are subcentimeter in size which are  too small to characterize but statistically favored to represent  simple cysts.      BILE DUCTS:  The intrahepatic and extrahepatic ducts are not dilated.      GALLBLADDER:  No calcified stones. No wall thickening.       PANCREAS:  The pancreas appears unremarkable without evidence of ductal  dilatation or masses.      SPLEEN:  The spleen is normal in size without focal lesions.      ADRENAL GLANDS:  Slightly decreased prominence of indeterminate right adrenal nodule  (series 201, image 99) measuring 1.4 x 0.9 cm, previously 1.4 x 1.3  cm favored to represent adrenal adenoma. Left adrenal gland  thickening is similar compared to multiple prior CTs.      KIDNEYS AND URETERS:  Similar appearance of lobulated kidney contours and mild left  pararenal space fat stranding. No hydroureteronephrosis or  nephroureterolithiasis is identified.      PELVIS:      BLADDER:  Status post radical cystoprostatectomy with ileal conduit visualized  in the right mid abdomen. The right parastomal hernia is again  visualized containing fat.      REPRODUCTIVE ORGANS:  The prostate is surgically absent. No pelvic mass or soft tissue  thickening.      BOWEL:  The stomach, small and large bowel are normal in appearance without  wall thickening or obstruction. The appendix appears normal.          VESSELS:  The aorta and IVC appear normal.      PERITONEUM/RETROPERITONEUM/LYMPH NODES:  No ascites or free air, no fluid collection.  Slightly decreased size  of right external iliac lymph node (series 201, image 191) measuring  1.1 cm, previously 1.4 cm. No evidence of new or enlarging  abdominopelvic lymph node.      BONE AND SOFT TISSUE:  No suspicious osseous lesions are identified. Unchanged bilateral  pars interarticularis fracture with associated grade 1-2 L5 on S1  anterolisthesis. Degenerative discogenic disease is noted in the  lower thoracic and lumbar spine. Fat and bowel containing umbilical  hernia (series 204, image 142) with defects as measuring up to 4.5 x  3.3 cm, similar to prior which previously measured 4.3 x 3.6 cm.      IMPRESSION:  1. Decreased prominence of right external iliac lymph node without  evidence of locoregional recurrence or  metastatic disease in the  chest, abdomen, or pelvis.  2. Small hiatal hernia with distal esophageal thickening, correlate  with reflux esophagitis.  3. Remaining chronic incidental findings are described above.      I personally reviewed the images/study and agree with the findings as  stated by Ko Rothman MD (Resident Physician). This study was  interpreted at [facility],  [place], [place].      MACRO:  None      Signed by: Satya Alexander 12/5/2024 6:50 AM  Dictation workstation:   [id number]        Yes

## 2025-03-24 NOTE — ED ADULT TRIAGE NOTE - PATIENT ON (OXYGEN DELIVERY METHOD)
Mouthwash:  1) maalox any flavor  2) liquid benadryl (diphenhydramine) 12.5 mg/5ml bottle   Mix equal parts 1:1  Use mouth wash 4 times a day before eating    Call if continued pain with swallowing and will add lidocaine   room air

## 2025-04-03 ENCOUNTER — NON-APPOINTMENT (OUTPATIENT)
Age: 75
End: 2025-04-03

## (undated) DEVICE — MARKING PEN W RULER

## (undated) DEVICE — GLV 8 PROTEXIS (WHITE)

## (undated) DEVICE — DRAPE C ARM 41X74"

## (undated) DEVICE — DRAPE 1/2 SHEET 40X57"

## (undated) DEVICE — DRAPE TOWEL BLUE 17" X 24"

## (undated) DEVICE — WARMING BLANKET LOWER ADULT

## (undated) DEVICE — Device

## (undated) DEVICE — GLV 8.5 PROTEXIS (WHITE)

## (undated) DEVICE — RAYTEC SPONGE 4X4 16PLY

## (undated) DEVICE — DRSG BANDAID 0.75X3"

## (undated) DEVICE — NDL HYPO SAFE 25G X 1.5" (ORANGE)

## (undated) DEVICE — VENODYNE/SCD SLEEVE CALF MEDIUM

## (undated) DEVICE — DRAPE MAYO STAND 30"

## (undated) DEVICE — PREP BETADINE KIT

## (undated) DEVICE — DRSG RESTRAINT LIMB WRAP AROUND

## (undated) DEVICE — STYLET INTRACRANIAL CATH EM

## (undated) DEVICE — NDL SPINAL 25G X 3.5" (BLUE)